# Patient Record
Sex: MALE | Race: OTHER | Employment: FULL TIME | URBAN - METROPOLITAN AREA
[De-identification: names, ages, dates, MRNs, and addresses within clinical notes are randomized per-mention and may not be internally consistent; named-entity substitution may affect disease eponyms.]

---

## 2017-02-06 RX ORDER — AMLODIPINE BESYLATE AND BENAZEPRIL HYDROCHLORIDE 5; 20 MG/1; MG/1
CAPSULE ORAL
Qty: 30 CAPSULE | Refills: 0 | Status: SHIPPED | OUTPATIENT
Start: 2017-02-06 | End: 2017-03-09

## 2017-02-27 RX ORDER — ATORVASTATIN CALCIUM 10 MG/1
TABLET, FILM COATED ORAL
Qty: 30 TABLET | Refills: 0 | Status: SHIPPED | OUTPATIENT
Start: 2017-02-27 | End: 2017-03-09

## 2017-03-09 ENCOUNTER — OFFICE VISIT (OUTPATIENT)
Dept: INTERNAL MEDICINE CLINIC | Facility: CLINIC | Age: 52
End: 2017-03-09

## 2017-03-09 ENCOUNTER — APPOINTMENT (OUTPATIENT)
Dept: LAB | Age: 52
End: 2017-03-09
Attending: FAMILY MEDICINE
Payer: COMMERCIAL

## 2017-03-09 VITALS
TEMPERATURE: 98 F | WEIGHT: 161.5 LBS | SYSTOLIC BLOOD PRESSURE: 118 MMHG | BODY MASS INDEX: 26.91 KG/M2 | OXYGEN SATURATION: 98 % | HEIGHT: 65 IN | HEART RATE: 70 BPM | DIASTOLIC BLOOD PRESSURE: 82 MMHG | RESPIRATION RATE: 16 BRPM

## 2017-03-09 DIAGNOSIS — I10 ESSENTIAL HYPERTENSION, BENIGN: Primary | ICD-10-CM

## 2017-03-09 DIAGNOSIS — E78.00 PURE HYPERCHOLESTEROLEMIA: ICD-10-CM

## 2017-03-09 DIAGNOSIS — I10 ESSENTIAL HYPERTENSION, BENIGN: ICD-10-CM

## 2017-03-09 DIAGNOSIS — M1A.9XX0 CHRONIC GOUT, UNSPECIFIED CAUSE, UNSPECIFIED SITE: ICD-10-CM

## 2017-03-09 DIAGNOSIS — E55.9 VITAMIN D DEFICIENCY: ICD-10-CM

## 2017-03-09 LAB
25-HYDROXYVITAMIN D (TOTAL): 27.6 NG/ML (ref 30–100)
ALBUMIN SERPL-MCNC: 4.2 G/DL (ref 3.5–4.8)
ALP LIVER SERPL-CCNC: 93 U/L (ref 45–117)
ALT SERPL-CCNC: 48 U/L (ref 17–63)
AST SERPL-CCNC: 22 U/L (ref 15–41)
BILIRUB SERPL-MCNC: 0.7 MG/DL (ref 0.1–2)
BUN BLD-MCNC: 10 MG/DL (ref 8–20)
CALCIUM BLD-MCNC: 9.5 MG/DL (ref 8.3–10.3)
CHLORIDE: 106 MMOL/L (ref 101–111)
CHOLEST SMN-MCNC: 157 MG/DL (ref ?–200)
CO2: 28 MMOL/L (ref 22–32)
COMPLEXED PSA SERPL-MCNC: 3.26 NG/ML (ref 0.01–4)
CREAT BLD-MCNC: 1.05 MG/DL (ref 0.7–1.3)
EST. AVERAGE GLUCOSE BLD GHB EST-MCNC: 108 MG/DL (ref 68–126)
GLUCOSE BLD-MCNC: 97 MG/DL (ref 70–99)
HBA1C MFR BLD HPLC: 5.4 % (ref ?–5.7)
HDLC SERPL-MCNC: 45 MG/DL (ref 45–?)
HDLC SERPL: 3.49 {RATIO} (ref ?–4.97)
LDLC SERPL CALC-MCNC: 60 MG/DL (ref ?–130)
M PROTEIN MFR SERPL ELPH: 8.3 G/DL (ref 6.1–8.3)
NONHDLC SERPL-MCNC: 112 MG/DL (ref ?–130)
POTASSIUM SERPL-SCNC: 4.7 MMOL/L (ref 3.6–5.1)
SODIUM SERPL-SCNC: 141 MMOL/L (ref 136–144)
TRIGLYCERIDES: 261 MG/DL (ref ?–150)
VLDL: 52 MG/DL (ref 5–40)

## 2017-03-09 PROCEDURE — 99214 OFFICE O/P EST MOD 30 MIN: CPT | Performed by: FAMILY MEDICINE

## 2017-03-09 PROCEDURE — 36415 COLL VENOUS BLD VENIPUNCTURE: CPT

## 2017-03-09 PROCEDURE — 80061 LIPID PANEL: CPT

## 2017-03-09 PROCEDURE — 80053 COMPREHEN METABOLIC PANEL: CPT

## 2017-03-09 PROCEDURE — 82306 VITAMIN D 25 HYDROXY: CPT

## 2017-03-09 PROCEDURE — 83036 HEMOGLOBIN GLYCOSYLATED A1C: CPT

## 2017-03-09 RX ORDER — AMLODIPINE BESYLATE AND BENAZEPRIL HYDROCHLORIDE 5; 20 MG/1; MG/1
CAPSULE ORAL
Qty: 90 CAPSULE | Refills: 1 | Status: SHIPPED | OUTPATIENT
Start: 2017-03-09 | End: 2018-04-25

## 2017-03-09 RX ORDER — ATORVASTATIN CALCIUM 10 MG/1
TABLET, FILM COATED ORAL
Qty: 90 TABLET | Refills: 1 | Status: SHIPPED | OUTPATIENT
Start: 2017-03-09 | End: 2017-10-02

## 2017-03-09 NOTE — PROGRESS NOTES
HPI:    Patient ID: Darin Celis is a 54year old male. HPI  Darin Celis is a 54year old male.   HPI:   Here for med ck  overall feels well   BP run 110-130/70-80      Taking meds  No issues w it    On chol med and is fasting today  Gout is g appears stable   CPM w meds          The patient indicates understanding of these issues and agrees to the plan. The patient is asked to return in 6mo.     Review of Systems           Current Outpatient Prescriptions:  Atorvastatin Calcium 10 MG Oral Tab T

## 2017-03-13 RX ORDER — FEBUXOSTAT 40 MG/1
TABLET ORAL
Qty: 90 TABLET | Refills: 0 | Status: SHIPPED | OUTPATIENT
Start: 2017-03-13 | End: 2017-06-28

## 2017-06-28 RX ORDER — FEBUXOSTAT 40 MG/1
TABLET ORAL
Qty: 90 TABLET | Refills: 0 | Status: SHIPPED | OUTPATIENT
Start: 2017-06-28 | End: 2017-10-30

## 2017-10-03 RX ORDER — ATORVASTATIN CALCIUM 10 MG/1
TABLET, FILM COATED ORAL
Qty: 90 TABLET | Refills: 1 | Status: SHIPPED | OUTPATIENT
Start: 2017-10-03 | End: 2018-04-15

## 2017-10-19 RX ORDER — FEBUXOSTAT 40 MG/1
TABLET ORAL
Qty: 90 TABLET | Refills: 0 | OUTPATIENT
Start: 2017-10-19

## 2017-10-30 ENCOUNTER — OFFICE VISIT (OUTPATIENT)
Dept: INTERNAL MEDICINE CLINIC | Facility: CLINIC | Age: 52
End: 2017-10-30

## 2017-10-30 VITALS
BODY MASS INDEX: 27 KG/M2 | RESPIRATION RATE: 16 BRPM | TEMPERATURE: 98 F | SYSTOLIC BLOOD PRESSURE: 128 MMHG | DIASTOLIC BLOOD PRESSURE: 76 MMHG | HEART RATE: 84 BPM | WEIGHT: 162 LBS

## 2017-10-30 DIAGNOSIS — Z12.11 COLON CANCER SCREENING: ICD-10-CM

## 2017-10-30 DIAGNOSIS — M1A.9XX0 CHRONIC GOUT WITHOUT TOPHUS, UNSPECIFIED CAUSE, UNSPECIFIED SITE: ICD-10-CM

## 2017-10-30 DIAGNOSIS — I10 ESSENTIAL HYPERTENSION, BENIGN: Primary | ICD-10-CM

## 2017-10-30 DIAGNOSIS — E78.00 PURE HYPERCHOLESTEROLEMIA: ICD-10-CM

## 2017-10-30 PROCEDURE — 99214 OFFICE O/P EST MOD 30 MIN: CPT | Performed by: FAMILY MEDICINE

## 2017-10-30 RX ORDER — FEBUXOSTAT 40 MG/1
40 TABLET, FILM COATED ORAL
Qty: 90 TABLET | Refills: 3 | Status: SHIPPED | OUTPATIENT
Start: 2017-10-30 | End: 2018-09-24

## 2017-10-30 NOTE — PROGRESS NOTES
HPI:    Patient ID: Mio Rosenberg is a 54year old male. HPI  Mio Rosenberg is a 54year old male.   HPI:   Here for med ck   Overall doing well  Taking his meds as directed      BP is good 130/80 range   Walks 3x a week  No issues w it     No re stable w meds  CPM       (M1A.9XX0) Chronic gout without tophus, unspecified cause, unspecified site  Plan: on meds  Seems stable    (Z12.11) Colon cancer screening  Plan: Dr Chaves Geardillon          The patient indicates understanding of these issues and agrees to

## 2018-04-16 RX ORDER — ATORVASTATIN CALCIUM 10 MG/1
TABLET, FILM COATED ORAL
Qty: 30 TABLET | Refills: 0 | Status: SHIPPED | OUTPATIENT
Start: 2018-04-16 | End: 2018-09-24

## 2018-04-25 RX ORDER — AMLODIPINE BESYLATE AND BENAZEPRIL HYDROCHLORIDE 5; 20 MG/1; MG/1
CAPSULE ORAL
Qty: 90 CAPSULE | Refills: 0 | Status: SHIPPED | OUTPATIENT
Start: 2018-04-25 | End: 2018-09-24

## 2018-07-24 RX ORDER — AMLODIPINE BESYLATE AND BENAZEPRIL HYDROCHLORIDE 5; 20 MG/1; MG/1
CAPSULE ORAL
Qty: 90 CAPSULE | Refills: 0 | OUTPATIENT
Start: 2018-07-24

## 2018-09-24 ENCOUNTER — OFFICE VISIT (OUTPATIENT)
Dept: INTERNAL MEDICINE CLINIC | Facility: CLINIC | Age: 53
End: 2018-09-24
Payer: COMMERCIAL

## 2018-09-24 VITALS
RESPIRATION RATE: 12 BRPM | HEIGHT: 65 IN | BODY MASS INDEX: 27.16 KG/M2 | HEART RATE: 68 BPM | TEMPERATURE: 99 F | DIASTOLIC BLOOD PRESSURE: 82 MMHG | WEIGHT: 163 LBS | SYSTOLIC BLOOD PRESSURE: 126 MMHG

## 2018-09-24 DIAGNOSIS — Z12.11 COLON CANCER SCREENING: ICD-10-CM

## 2018-09-24 DIAGNOSIS — I10 ESSENTIAL HYPERTENSION, BENIGN: Primary | ICD-10-CM

## 2018-09-24 DIAGNOSIS — M1A.9XX0 CHRONIC GOUT WITHOUT TOPHUS, UNSPECIFIED CAUSE, UNSPECIFIED SITE: ICD-10-CM

## 2018-09-24 DIAGNOSIS — E78.00 PURE HYPERCHOLESTEROLEMIA: ICD-10-CM

## 2018-09-24 DIAGNOSIS — Z12.5 SPECIAL SCREENING, PROSTATE CANCER: ICD-10-CM

## 2018-09-24 PROCEDURE — 99214 OFFICE O/P EST MOD 30 MIN: CPT | Performed by: FAMILY MEDICINE

## 2018-09-24 RX ORDER — AMLODIPINE BESYLATE AND BENAZEPRIL HYDROCHLORIDE 5; 20 MG/1; MG/1
CAPSULE ORAL
Qty: 90 CAPSULE | Refills: 0 | Status: SHIPPED | OUTPATIENT
Start: 2018-09-24 | End: 2018-12-20

## 2018-09-24 RX ORDER — FEBUXOSTAT 40 MG/1
40 TABLET, FILM COATED ORAL
Qty: 90 TABLET | Refills: 3 | Status: SHIPPED | OUTPATIENT
Start: 2018-09-24 | End: 2019-08-23

## 2018-09-24 RX ORDER — ATORVASTATIN CALCIUM 10 MG/1
10 TABLET, FILM COATED ORAL NIGHTLY
Qty: 90 TABLET | Refills: 0 | Status: SHIPPED | OUTPATIENT
Start: 2018-09-24 | End: 2019-08-23

## 2018-09-24 NOTE — PROGRESS NOTES
HPI:    Patient ID: Laura Session is a 50year old male. HPI  Laura Session is a 50year old male. HPI:   Patient presents for recheck of his hypertension/med ck  .  Pt has been taking medications as instructed, no medication side effects, ho status: Former Smoker        Quit date: 2001        Years since quittin.7      Smokeless tobacco: Never Used    Alcohol use: No      Alcohol/week: 0.0 oz    Drug use: No        REVIEW OF SYSTEMS:   GENERAL HEALTH: feels well otherwise  RESPIRATOR Prescriptions Disp Refills   • Amlodipine Besy-Benazepril HCl 5-20 MG Oral Cap 90 capsule 0     Sig: TAKE 1 CAPSULE BY MOUTH ONCE DAILY. • febuxostat (ULORIC) 40 MG Oral Tab 90 tablet 3     Sig: Take 1 tablet (40 mg total) by mouth once daily.    • atorva

## 2018-11-20 ENCOUNTER — APPOINTMENT (OUTPATIENT)
Dept: LAB | Age: 53
End: 2018-11-20
Attending: FAMILY MEDICINE
Payer: COMMERCIAL

## 2018-11-20 DIAGNOSIS — Z12.5 SPECIAL SCREENING, PROSTATE CANCER: ICD-10-CM

## 2018-11-20 DIAGNOSIS — E78.00 PURE HYPERCHOLESTEROLEMIA: ICD-10-CM

## 2018-11-20 DIAGNOSIS — I10 ESSENTIAL HYPERTENSION, BENIGN: ICD-10-CM

## 2018-11-20 PROCEDURE — 80061 LIPID PANEL: CPT | Performed by: FAMILY MEDICINE

## 2018-11-20 PROCEDURE — 36415 COLL VENOUS BLD VENIPUNCTURE: CPT | Performed by: FAMILY MEDICINE

## 2018-11-20 PROCEDURE — 80053 COMPREHEN METABOLIC PANEL: CPT | Performed by: FAMILY MEDICINE

## 2018-11-20 PROCEDURE — 84153 ASSAY OF PSA TOTAL: CPT | Performed by: FAMILY MEDICINE

## 2018-12-20 RX ORDER — AMLODIPINE BESYLATE AND BENAZEPRIL HYDROCHLORIDE 5; 20 MG/1; MG/1
CAPSULE ORAL
Qty: 90 CAPSULE | Refills: 0 | Status: SHIPPED | OUTPATIENT
Start: 2018-12-20 | End: 2019-03-27

## 2018-12-20 NOTE — TELEPHONE ENCOUNTER
Approved per protocol  Amlodipine  Last OV relevant to medication: 9/24/18  Last refill date: 9/24/18 #/refills: 0  When pt was asked to return for OV: 6 months  Upcoming appt/reason: 12/27/18  Recent Labs:  11/20/18: PSA/Lipid/ CMP

## 2018-12-27 ENCOUNTER — OFFICE VISIT (OUTPATIENT)
Dept: INTERNAL MEDICINE CLINIC | Facility: CLINIC | Age: 53
End: 2018-12-27
Payer: COMMERCIAL

## 2018-12-27 VITALS
SYSTOLIC BLOOD PRESSURE: 134 MMHG | HEIGHT: 64 IN | HEART RATE: 85 BPM | RESPIRATION RATE: 16 BRPM | BODY MASS INDEX: 28.25 KG/M2 | TEMPERATURE: 99 F | OXYGEN SATURATION: 98 % | DIASTOLIC BLOOD PRESSURE: 72 MMHG | WEIGHT: 165.5 LBS

## 2018-12-27 DIAGNOSIS — Z12.11 SCREEN FOR COLON CANCER: Primary | ICD-10-CM

## 2018-12-27 DIAGNOSIS — Z00.00 WELLNESS EXAMINATION: ICD-10-CM

## 2018-12-27 PROCEDURE — 99396 PREV VISIT EST AGE 40-64: CPT | Performed by: FAMILY MEDICINE

## 2018-12-27 NOTE — PROGRESS NOTES
HPI:    Patient ID: Carl Santillan is a 48year old male.     HPI  Carl Santillan is a 48year old male who presents for a complete physical exam.   HPI:       Wt Readings from Last 6 Encounters:  12/27/18 : 165 lb 8 oz  09/24/18 : 163 lb  10/30/17 : oz    Drug use: No     .        REVIEW OF SYSTEMS:   GENERAL: feels well otherwise  SKIN: denies rash  HEENT: denies nasal congestion, sinus pain or ST  LUNGS: denies shortness of breath  CARDIOVASCULAR: denies chest pain on exertion  GI: denies abdominal Known Allergies   PHYSICAL EXAM:   Physical Exam           ASSESSMENT/PLAN:   Screen for colon cancer  (primary encounter diagnosis)  Wellness examination    No orders of the defined types were placed in this encounter.       Meds This Visit:  Requested Pre

## 2019-03-27 RX ORDER — AMLODIPINE BESYLATE AND BENAZEPRIL HYDROCHLORIDE 5; 20 MG/1; MG/1
CAPSULE ORAL
Qty: 90 CAPSULE | Refills: 0 | Status: SHIPPED | OUTPATIENT
Start: 2019-03-27 | End: 2019-07-05

## 2019-07-05 RX ORDER — AMLODIPINE BESYLATE AND BENAZEPRIL HYDROCHLORIDE 5; 20 MG/1; MG/1
CAPSULE ORAL
Qty: 30 CAPSULE | Refills: 0 | Status: SHIPPED | OUTPATIENT
Start: 2019-07-05 | End: 2019-08-23

## 2019-07-05 NOTE — TELEPHONE ENCOUNTER
Failed protocol     Last refill:  03/27/2019 Amlodipine 5/20 #90 NR    LOV:   12/27/2018 Dr Kings Palmer RTC 1 year     No FOV scheduled

## 2019-08-05 RX ORDER — AMLODIPINE BESYLATE AND BENAZEPRIL HYDROCHLORIDE 5; 20 MG/1; MG/1
CAPSULE ORAL
Qty: 30 CAPSULE | Refills: 0 | OUTPATIENT
Start: 2019-08-05

## 2019-08-05 NOTE — TELEPHONE ENCOUNTER
LVM for patient to call office and make appointment.     Last filled 7/5/19 #30    LOV 12/27/18     Last labs 11/201/8

## 2019-08-09 PROBLEM — K64.1 SECOND DEGREE HEMORRHOIDS: Status: ACTIVE | Noted: 2019-08-09

## 2019-08-09 PROBLEM — K57.30 DIVERTICULOSIS OF LARGE INTESTINE WITHOUT PERFORATION OR ABSCESS WITHOUT BLEEDING: Status: ACTIVE | Noted: 2019-08-09

## 2019-08-09 PROBLEM — Z12.11 SPECIAL SCREENING FOR MALIGNANT NEOPLASM OF COLON: Status: ACTIVE | Noted: 2019-08-09

## 2019-08-23 ENCOUNTER — OFFICE VISIT (OUTPATIENT)
Dept: INTERNAL MEDICINE CLINIC | Facility: CLINIC | Age: 54
End: 2019-08-23
Payer: COMMERCIAL

## 2019-08-23 VITALS
DIASTOLIC BLOOD PRESSURE: 78 MMHG | OXYGEN SATURATION: 94 % | HEIGHT: 65 IN | WEIGHT: 162.75 LBS | RESPIRATION RATE: 16 BRPM | HEART RATE: 80 BPM | TEMPERATURE: 98 F | BODY MASS INDEX: 27.11 KG/M2 | SYSTOLIC BLOOD PRESSURE: 128 MMHG

## 2019-08-23 DIAGNOSIS — I10 ESSENTIAL HYPERTENSION, BENIGN: Primary | ICD-10-CM

## 2019-08-23 DIAGNOSIS — M1A.9XX0 CHRONIC GOUT WITHOUT TOPHUS, UNSPECIFIED CAUSE, UNSPECIFIED SITE: ICD-10-CM

## 2019-08-23 DIAGNOSIS — E78.00 PURE HYPERCHOLESTEROLEMIA: ICD-10-CM

## 2019-08-23 DIAGNOSIS — Z12.5 SPECIAL SCREENING, PROSTATE CANCER: ICD-10-CM

## 2019-08-23 PROCEDURE — 99214 OFFICE O/P EST MOD 30 MIN: CPT | Performed by: FAMILY MEDICINE

## 2019-08-23 RX ORDER — ATORVASTATIN CALCIUM 10 MG/1
10 TABLET, FILM COATED ORAL NIGHTLY
Qty: 90 TABLET | Refills: 1 | Status: SHIPPED | OUTPATIENT
Start: 2019-08-23 | End: 2020-02-17

## 2019-08-23 RX ORDER — AMLODIPINE BESYLATE AND BENAZEPRIL HYDROCHLORIDE 5; 20 MG/1; MG/1
1 CAPSULE ORAL
Qty: 30 CAPSULE | Refills: 1 | Status: SHIPPED | OUTPATIENT
Start: 2019-08-23 | End: 2019-10-18

## 2019-08-23 RX ORDER — FEBUXOSTAT 40 MG/1
40 TABLET, FILM COATED ORAL
Qty: 90 TABLET | Refills: 1 | Status: SHIPPED | OUTPATIENT
Start: 2019-08-23 | End: 2020-03-30

## 2019-08-23 NOTE — PROGRESS NOTES
HPI:    Patient ID: Pastor Salgado is a 48year old male. HPI  Pastor Salgado is a 48year old male. HPI:   Pt is here for med ck/follow up. Overall is doing well. Is taking meds as directed.   No issues w medications      Did have colonoscopy °C) (Oral)   Resp 16   Ht 65\"   Wt 162 lb 12 oz   SpO2 94%   BMI 27.08 kg/m²   GENERAL: well developed, well nourished,in no apparent distress  SKIN: no rashes  NECK: supple,no adenopathy  LUNGS: clear to auscultation  CARDIO: RRR without murmur  EXTREMIT Oral Tab 90 tablet 3     Sig: Take 1 tablet (40 mg total) by mouth once daily. • atorvastatin 10 MG Oral Tab 90 tablet 0     Sig: Take 1 tablet (10 mg total) by mouth nightly.    • amLODIPine Besy-Benazepril HCl 5-20 MG Oral Cap 30 capsule 0     Sig: Take

## 2019-10-18 ENCOUNTER — TELEPHONE (OUTPATIENT)
Dept: INTERNAL MEDICINE CLINIC | Facility: CLINIC | Age: 54
End: 2019-10-18

## 2019-10-18 RX ORDER — AMLODIPINE BESYLATE AND BENAZEPRIL HYDROCHLORIDE 5; 20 MG/1; MG/1
CAPSULE ORAL
Qty: 30 CAPSULE | Refills: 0 | Status: SHIPPED | OUTPATIENT
Start: 2019-10-18 | End: 2019-10-21

## 2019-10-21 RX ORDER — AMLODIPINE BESYLATE AND BENAZEPRIL HYDROCHLORIDE 5; 20 MG/1; MG/1
1 CAPSULE ORAL
Qty: 90 CAPSULE | Refills: 1 | Status: SHIPPED | OUTPATIENT
Start: 2019-10-21 | End: 2020-04-23

## 2019-12-11 ENCOUNTER — TELEPHONE (OUTPATIENT)
Dept: INTERNAL MEDICINE CLINIC | Facility: CLINIC | Age: 54
End: 2019-12-11

## 2019-12-11 ENCOUNTER — APPOINTMENT (OUTPATIENT)
Dept: LAB | Age: 54
End: 2019-12-11
Attending: FAMILY MEDICINE
Payer: COMMERCIAL

## 2019-12-11 DIAGNOSIS — I10 ESSENTIAL HYPERTENSION, BENIGN: ICD-10-CM

## 2019-12-11 DIAGNOSIS — E78.00 PURE HYPERCHOLESTEROLEMIA: ICD-10-CM

## 2019-12-11 DIAGNOSIS — Z12.5 SPECIAL SCREENING, PROSTATE CANCER: ICD-10-CM

## 2019-12-11 PROCEDURE — 84153 ASSAY OF PSA TOTAL: CPT | Performed by: FAMILY MEDICINE

## 2019-12-11 PROCEDURE — 80053 COMPREHEN METABOLIC PANEL: CPT | Performed by: FAMILY MEDICINE

## 2019-12-11 PROCEDURE — 80061 LIPID PANEL: CPT | Performed by: FAMILY MEDICINE

## 2019-12-11 PROCEDURE — 36415 COLL VENOUS BLD VENIPUNCTURE: CPT | Performed by: FAMILY MEDICINE

## 2019-12-23 ENCOUNTER — OFFICE VISIT (OUTPATIENT)
Dept: SURGERY | Facility: CLINIC | Age: 54
End: 2019-12-23
Payer: COMMERCIAL

## 2019-12-23 VITALS — SYSTOLIC BLOOD PRESSURE: 167 MMHG | HEART RATE: 77 BPM | DIASTOLIC BLOOD PRESSURE: 91 MMHG

## 2019-12-23 DIAGNOSIS — R97.20 ELEVATED PSA: Primary | ICD-10-CM

## 2019-12-23 PROCEDURE — 99243 OFF/OP CNSLTJ NEW/EST LOW 30: CPT | Performed by: UROLOGY

## 2019-12-23 PROCEDURE — 81003 URINALYSIS AUTO W/O SCOPE: CPT | Performed by: UROLOGY

## 2019-12-23 NOTE — PROGRESS NOTES
Rooming Clinician:     Darin Celis is a 48year old male. No chief complaint on file. HPI:     Patient comes to the office for evaluation of an elevated PSA of 10.6 ng/mL. PSA was 3.96 in 2018 and 3.26 in 2017.   Patient has an uncle on his mot Never Used    Alcohol use: No      Alcohol/week: 0.0 standard drinks    Drug use: No       REVIEW OF SYSTEMS:     GENERAL HEALTH: feels well otherwise  SKIN: denies any unusual skin lesions or rashes  RESPIRATORY: denies shortness of breath with exertion urinary and blood tests that can project the risk of prostate cancer when the PSA is elevated.  I discussed other means of evaluating the prostate including transrectal ultrasonography and needle biopsy of the prostate, 3 tanisha MRI, fusion prostate biopsy,

## 2019-12-26 ENCOUNTER — APPOINTMENT (OUTPATIENT)
Dept: LAB | Age: 54
End: 2019-12-26
Attending: UROLOGY
Payer: COMMERCIAL

## 2019-12-26 PROCEDURE — 84154 ASSAY OF PSA FREE: CPT | Performed by: UROLOGY

## 2019-12-26 PROCEDURE — 36415 COLL VENOUS BLD VENIPUNCTURE: CPT | Performed by: UROLOGY

## 2019-12-26 PROCEDURE — 84153 ASSAY OF PSA TOTAL: CPT | Performed by: UROLOGY

## 2019-12-27 ENCOUNTER — TELEPHONE (OUTPATIENT)
Dept: SURGERY | Facility: CLINIC | Age: 54
End: 2019-12-27

## 2019-12-27 ENCOUNTER — OFFICE VISIT (OUTPATIENT)
Dept: INTERNAL MEDICINE CLINIC | Facility: CLINIC | Age: 54
End: 2019-12-27
Payer: COMMERCIAL

## 2019-12-27 VITALS
HEIGHT: 65 IN | HEART RATE: 74 BPM | DIASTOLIC BLOOD PRESSURE: 68 MMHG | SYSTOLIC BLOOD PRESSURE: 124 MMHG | RESPIRATION RATE: 16 BRPM | OXYGEN SATURATION: 98 % | WEIGHT: 164.25 LBS | TEMPERATURE: 98 F | BODY MASS INDEX: 27.36 KG/M2

## 2019-12-27 DIAGNOSIS — Z00.00 WELLNESS EXAMINATION: Primary | ICD-10-CM

## 2019-12-27 DIAGNOSIS — Z00.00 LABORATORY EXAM ORDERED AS PART OF ROUTINE GENERAL MEDICAL EXAMINATION: ICD-10-CM

## 2019-12-27 DIAGNOSIS — S76.212A GROIN STRAIN, LEFT, INITIAL ENCOUNTER: ICD-10-CM

## 2019-12-27 DIAGNOSIS — N52.9 ERECTILE DYSFUNCTION, UNSPECIFIED ERECTILE DYSFUNCTION TYPE: ICD-10-CM

## 2019-12-27 DIAGNOSIS — I10 ESSENTIAL HYPERTENSION, BENIGN: ICD-10-CM

## 2019-12-27 PROCEDURE — 99213 OFFICE O/P EST LOW 20 MIN: CPT | Performed by: FAMILY MEDICINE

## 2019-12-27 PROCEDURE — 99396 PREV VISIT EST AGE 40-64: CPT | Performed by: FAMILY MEDICINE

## 2019-12-27 NOTE — TELEPHONE ENCOUNTER
S/W pt and gave results and instructions in RP's msg below and I also gave pt an appt for Thurs.  1/9 at 8am

## 2019-12-27 NOTE — TELEPHONE ENCOUNTER
----- Message from Elias Cox MD sent at 12/26/2019  4:01 PM CST -----  Your recent PSA is abnormal.  Total PSA is still 11.3. Free PSA is 17%. Free PSA is an indeterminate range.   When lower than 10% suggest higher risk of prostate cancer and low

## 2019-12-27 NOTE — PROGRESS NOTES
HPI:    Patient ID: Maribel Alvarado is a 48year old male.     HPI  Maribel Alvarado is a 48year old male who presents for a complete physical exam.   HPI:       Wt Readings from Last 6 Encounters:  12/27/19 : 164 lb 4 oz (74.5 kg)  08/23/19 : 162 lb 12 essential hypertension    • Wears glasses       Past Surgical History:   Procedure Laterality Date   • OTHER SURGICAL HISTORY      ganglion cyst rxn      Family History   Problem Relation Age of Onset   • Hypertension Father    • Hypertension Mother    • S lesions   Mild disc to palpation L groin area     EXTREMITIES: no edema  NEURO: motor and sensory are grossly intact    ASSESSMENT AND PLAN:   Jacqui Edmonds is a 48year old male who presents for a complete physical exam. Pt's weight is Body mass index Referrals:  None       [id number]

## 2020-01-09 ENCOUNTER — OFFICE VISIT (OUTPATIENT)
Dept: SURGERY | Facility: CLINIC | Age: 55
End: 2020-01-09
Payer: COMMERCIAL

## 2020-01-09 VITALS — DIASTOLIC BLOOD PRESSURE: 95 MMHG | SYSTOLIC BLOOD PRESSURE: 147 MMHG | HEART RATE: 73 BPM

## 2020-01-09 DIAGNOSIS — R97.20 ELEVATED PSA: Primary | ICD-10-CM

## 2020-01-09 PROCEDURE — 99213 OFFICE O/P EST LOW 20 MIN: CPT | Performed by: UROLOGY

## 2020-01-09 NOTE — PROGRESS NOTES
Rooming Clinician:     Deangelo Wilder is a 48year old male. Patient presents with:  elevated psa: Here for Select Mdx test        HPI:     Patient returns with a history of an elevated PSA 11.3 on repeat with free PSA of 17%.   No significant voiding s HPI  NEURO: no sensory or motor complaint    EXAM:     BP (!) 147/95   Pulse 73   GENERAL: well developed, well nourished,in no apparent distress  SKIN: no rashes,no suspicious lesions  HEENT: atraumatic, normocephalic,ears and throat are clear  NECK: supp

## 2020-01-24 ENCOUNTER — PATIENT MESSAGE (OUTPATIENT)
Dept: SURGERY | Facility: CLINIC | Age: 55
End: 2020-01-24

## 2020-01-24 ENCOUNTER — APPOINTMENT (OUTPATIENT)
Dept: LAB | Age: 55
End: 2020-01-24
Attending: FAMILY MEDICINE
Payer: COMMERCIAL

## 2020-01-24 DIAGNOSIS — R97.20 ELEVATED PSA: Primary | ICD-10-CM

## 2020-01-24 DIAGNOSIS — N52.9 ERECTILE DYSFUNCTION, UNSPECIFIED ERECTILE DYSFUNCTION TYPE: ICD-10-CM

## 2020-01-24 LAB — TSI SER-ACNC: 0.8 MIU/ML (ref 0.36–3.74)

## 2020-01-24 PROCEDURE — 84443 ASSAY THYROID STIM HORMONE: CPT

## 2020-01-24 PROCEDURE — 36415 COLL VENOUS BLD VENIPUNCTURE: CPT

## 2020-01-24 PROCEDURE — 84403 ASSAY OF TOTAL TESTOSTERONE: CPT

## 2020-01-24 PROCEDURE — 84402 ASSAY OF FREE TESTOSTERONE: CPT

## 2020-01-24 NOTE — TELEPHONE ENCOUNTER
Please advised patient that select urinary MDX is abnormal.  Does suggest significant risk of prostate cancer. Recommend prostate MRI and then follow-up discussion in the office.

## 2020-01-29 ENCOUNTER — TELEPHONE (OUTPATIENT)
Dept: SURGERY | Facility: CLINIC | Age: 55
End: 2020-01-29

## 2020-01-29 LAB
TESTOSTERONE TOTAL: 252 NG/DL
TESTOSTERONE, FREE -MS/MS: 75.4 PG/ML

## 2020-01-29 NOTE — TELEPHONE ENCOUNTER
Per Tre Balderas, needing to speak to Dr. Robbin Pickens or nurses regarding select mdx testing.  Needing updated insurane Reference number is [id number]  Please advise

## 2020-01-30 NOTE — TELEPHONE ENCOUNTER
Please notify pt when MRI is approved. Spoke with patient and reviewed Select Mdx result and Dr Ruslan Cody recommendation. Order for MRI will be place. I informed pt to wait for the call from manage care with prior auth.  Scheduling number given to pt

## 2020-02-11 ENCOUNTER — TELEPHONE (OUTPATIENT)
Dept: SURGERY | Facility: CLINIC | Age: 55
End: 2020-02-11

## 2020-02-11 NOTE — TELEPHONE ENCOUNTER
Pt called stating pt is scheduled for mri 2-12-20 at 7:30pm.  Has the mri been approved by pt's insurance.   Please call pt

## 2020-02-12 ENCOUNTER — PATIENT MESSAGE (OUTPATIENT)
Dept: SURGERY | Facility: CLINIC | Age: 55
End: 2020-02-12

## 2020-02-12 NOTE — TELEPHONE ENCOUNTER
Spoke with patient and he rescheduled for 2/24/20.   I have contacted Karen Reyes to help expedite the approval.

## 2020-02-17 RX ORDER — ATORVASTATIN CALCIUM 10 MG/1
TABLET, FILM COATED ORAL
Qty: 90 TABLET | Refills: 0 | Status: SHIPPED | OUTPATIENT
Start: 2020-02-17 | End: 2020-06-01

## 2020-02-17 NOTE — TELEPHONE ENCOUNTER
Received authorization for mri with and without contrast auth valid 2/14/2020-8/12/2020  auth # is [id number]

## 2020-02-25 ENCOUNTER — TELEPHONE (OUTPATIENT)
Dept: SURGERY | Facility: CLINIC | Age: 55
End: 2020-02-25

## 2020-02-25 NOTE — TELEPHONE ENCOUNTER
Wife is requesting to speak to the nurse or Dr Edison Hernandez about MRI test that the pt is supposed to get done at [facility] as pts appt. Has been cx twice. Wife States that [facility] did not have the necessary equipment to get the test done.  Wife states that

## 2020-02-25 NOTE — TELEPHONE ENCOUNTER
Patient called back. Confirmed his MRI on 3/9 7am at [facility]. He also asked about some charges made on his account. Referred him to Billing Dept at [phone]    All questions answered. This encounter is now closed.

## 2020-03-09 ENCOUNTER — HOSPITAL ENCOUNTER (OUTPATIENT)
Dept: MRI IMAGING | Facility: HOSPITAL | Age: 55
Discharge: HOME | End: 2020-03-09
Attending: UROLOGY
Payer: COMMERCIAL

## 2020-03-09 DIAGNOSIS — R97.20 ELEVATED PSA: ICD-10-CM

## 2020-03-09 LAB — CREAT BLD-MCNC: 1 MG/DL (ref 0.7–1.3)

## 2020-03-09 PROCEDURE — 72197 MRI PELVIS W/O & W/DYE: CPT | Performed by: UROLOGY

## 2020-03-09 PROCEDURE — 82565 ASSAY OF CREATININE: CPT

## 2020-03-09 PROCEDURE — A9575 INJ GADOTERATE MEGLUMI 0.1ML: HCPCS | Performed by: UROLOGY

## 2020-03-11 NOTE — PROGRESS NOTES
Your recent MRI of the prostate shows the prostate volume of 76 mL. Normal is 15-20. This suggests fairly substantial enlargement of the prostate which can sometimes account for an elevation of the PSA.   It shows no highly suggestive or significant abnor

## 2020-03-13 ENCOUNTER — TELEPHONE (OUTPATIENT)
Dept: SURGERY | Facility: CLINIC | Age: 55
End: 2020-03-13

## 2020-03-13 NOTE — TELEPHONE ENCOUNTER
Spoke to patient to relay MD's message regarding his MRI result. Patient confirmed that he already read the result at [facility] [address]. Patient has na appointment on 3/16 at 215pm.     All questions answered.

## 2020-03-16 ENCOUNTER — TELEPHONE (OUTPATIENT)
Dept: SURGERY | Facility: CLINIC | Age: 55
End: 2020-03-16

## 2020-03-16 NOTE — TELEPHONE ENCOUNTER
Spoke to patient. Requesting to speak to MD to discuss his MRI result. Patient unable to see him today's appointment due to [address] situation. MD made aware face-to-face.

## 2020-03-16 NOTE — TELEPHONE ENCOUNTER
Informed patient that MD already aware of his personal request for a call back regarding his MRI discussions.

## 2020-03-16 NOTE — TELEPHONE ENCOUNTER
Call patient and spouse regarding reasons for having a biopsy. I reviewed his PSA history showing a recent PSA of about 11 and free PSA of 17%. Abnormal urinary select MDX.   MRI shows prostate volume of 76 cc with PSA density of 0.144 patient and spouse

## 2020-03-17 RX ORDER — CIPROFLOXACIN 500 MG/1
500 TABLET, FILM COATED ORAL 2 TIMES DAILY
Qty: 6 TABLET | Refills: 0 | Status: SHIPPED | OUTPATIENT
Start: 2020-03-17 | End: 2020-03-20

## 2020-03-17 RX ORDER — CEFDINIR 300 MG/1
300 CAPSULE ORAL EVERY 12 HOURS
Qty: 6 CAPSULE | Refills: 0 | Status: SHIPPED | OUTPATIENT
Start: 2020-03-17 | End: 2020-03-20

## 2020-03-17 NOTE — TELEPHONE ENCOUNTER
Spoke with patient ad scheduled TRUS PNBX on 4/2/20. Reviewed all pre op instrucitons and sent them to pt via email. Verbalized understanding. [email or web address]

## 2020-03-30 RX ORDER — FEBUXOSTAT 40 MG/1
TABLET, FILM COATED ORAL
Qty: 90 TABLET | Refills: 0 | Status: SHIPPED | OUTPATIENT
Start: 2020-03-30 | End: 2020-06-01

## 2020-03-30 NOTE — TELEPHONE ENCOUNTER
Febuxostat 40 mg  Last OV relevant to medication: 12-27-19  Last refill date: 8-23-19 #/refills: 1  When pt was asked to return for OV: CPX 1yr  Upcoming appt/reason: none  Recent labs: none

## 2020-04-23 RX ORDER — AMLODIPINE BESYLATE AND BENAZEPRIL HYDROCHLORIDE 5; 20 MG/1; MG/1
CAPSULE ORAL
Qty: 90 CAPSULE | Refills: 0 | Status: SHIPPED | OUTPATIENT
Start: 2020-04-23 | End: 2020-06-01

## 2020-04-23 NOTE — TELEPHONE ENCOUNTER
AMLODIPINE-BENAZEPRIL 5-20 MG  Protocol Passed     Last OV relevant to medication: 12/27/19  Last refill date: 10/21/19      #/refills: #90, 1 refill  When pt was asked to return for OV: 1 yr   Upcoming appt/reason: none scheduled  Recent Labs: 12/11/19

## 2020-05-04 ENCOUNTER — TELEPHONE (OUTPATIENT)
Dept: SURGERY | Facility: CLINIC | Age: 55
End: 2020-05-04

## 2020-05-04 ENCOUNTER — PATIENT MESSAGE (OUTPATIENT)
Dept: SURGERY | Facility: CLINIC | Age: 55
End: 2020-05-04

## 2020-05-04 NOTE — TELEPHONE ENCOUNTER
Pt has a biopsy scheduled for Thursday and was prescribed medication has questions about when should he take the medication before or after biopsy please advise

## 2020-05-05 NOTE — TELEPHONE ENCOUNTER
From: Gisela Bo  To: Caleb oRss MD  Sent: 5/4/2020 1:25 PM CDT  Subject: Prescription Question    Good afternoon. The instruction for taking ciprofloxacin HCL was not clear. Do I need to take it 3days before the biopsy or after?  Instruction

## 2020-05-05 NOTE — TELEPHONE ENCOUNTER
Spoke to patient regarding his concerns of his medication. Per patient, he was already given a called back and instructions were provided. Patient thankful and no more questions at this time.

## 2020-05-07 ENCOUNTER — PROCEDURE (OUTPATIENT)
Dept: SURGERY | Facility: CLINIC | Age: 55
End: 2020-05-07
Payer: COMMERCIAL

## 2020-05-07 VITALS — DIASTOLIC BLOOD PRESSURE: 85 MMHG | TEMPERATURE: 99 F | HEART RATE: 84 BPM | SYSTOLIC BLOOD PRESSURE: 131 MMHG

## 2020-05-07 DIAGNOSIS — N40.0 ENLARGED PROSTATE: Primary | ICD-10-CM

## 2020-05-07 DIAGNOSIS — R97.20 ELEVATED PSA: ICD-10-CM

## 2020-05-07 PROCEDURE — 76872 US TRANSRECTAL: CPT | Performed by: UROLOGY

## 2020-05-07 PROCEDURE — 55700 BIOPSY OF PROSTATE,NEEDLE/PUNCH: CPT | Performed by: UROLOGY

## 2020-05-07 NOTE — PROGRESS NOTES
Rooming Clinician:     Pastor Salgado is a 48year old male. Patient presents with:  Procedure: prostate biopsy        HPI:     Patient is here for a prostate ultrasound and biopsy. He has a history of a elevated PSA of 11.3 with a free PSA of 17%.   P 19.3      Smokeless tobacco: Never Used    Alcohol use: No      Alcohol/week: 0.0 standard drinks    Drug use: No       REVIEW OF SYSTEMS:     GENERAL HEALTH: feels well otherwise  SKIN: denies any unusual skin lesions or rashes  RESPIRATORY: denies shortn cc            NEURO: grossly normal  EXTREMITIES: no cyanosis, clubbing or edema    LAB:     Lab Results   Component Value Date    WBC 6.4 05/13/2016    HGB 14.8 05/13/2016    HCT 44.5 05/13/2016    .0 05/13/2016    CREATSERUM 1.06 12/11/2019    BUN

## 2020-05-11 ENCOUNTER — TELEPHONE (OUTPATIENT)
Dept: SURGERY | Facility: CLINIC | Age: 55
End: 2020-05-11

## 2020-05-11 DIAGNOSIS — R97.20 ELEVATED PSA: Primary | ICD-10-CM

## 2020-05-11 RX ORDER — SILDENAFIL 100 MG/1
100 TABLET, FILM COATED ORAL
Qty: 30 TABLET | Refills: 5 | Status: SHIPPED | OUTPATIENT
Start: 2020-05-11

## 2020-05-11 NOTE — TELEPHONE ENCOUNTER
Called patient to report biopsy report which was negative. No evidence of cancer. Patient does have a history of an enlarged prostate. I explained to him just an enlarged prostate can cause an elevation of the PSA.   No other treatments or recommendation

## 2020-06-01 RX ORDER — AMLODIPINE BESYLATE AND BENAZEPRIL HYDROCHLORIDE 5; 20 MG/1; MG/1
CAPSULE ORAL
Qty: 90 CAPSULE | Refills: 0 | Status: SHIPPED | OUTPATIENT
Start: 2020-06-01 | End: 2020-11-09

## 2020-06-01 RX ORDER — FEBUXOSTAT 40 MG/1
TABLET, FILM COATED ORAL
Qty: 90 TABLET | Refills: 0 | Status: SHIPPED | OUTPATIENT
Start: 2020-06-01 | End: 2020-08-31

## 2020-06-01 RX ORDER — ATORVASTATIN CALCIUM 10 MG/1
TABLET, FILM COATED ORAL
Qty: 90 TABLET | Refills: 0 | Status: SHIPPED | OUTPATIENT
Start: 2020-06-01 | End: 2020-08-31

## 2020-06-01 NOTE — TELEPHONE ENCOUNTER
Atorvastatin 10 mg 1 tab nightly filled 2-17-20 90 with 0 refills   Amlodipine benazepril 5-20 mg 1 cap daily filled 4-23-20 90 with 0 refills   Febuxostat 40 mg 1 tab daily filled 3-30-20 90 with 0 refills     LOV 12-27-19   return for CPX in 1yr.   no up

## 2020-08-31 RX ORDER — ATORVASTATIN CALCIUM 10 MG/1
TABLET, FILM COATED ORAL
Qty: 90 TABLET | Refills: 0 | Status: SHIPPED | OUTPATIENT
Start: 2020-08-31 | End: 2020-12-28

## 2020-08-31 RX ORDER — FEBUXOSTAT 40 MG/1
TABLET, FILM COATED ORAL
Qty: 90 TABLET | Refills: 0 | Status: SHIPPED | OUTPATIENT
Start: 2020-08-31 | End: 2020-12-28

## 2020-08-31 NOTE — TELEPHONE ENCOUNTER
Febuxostat 40mg last filled  6/1/20 #90     LOV 12/27/19     Upcoming appt  - none     Last labs 12/23/19

## 2020-11-09 RX ORDER — AMLODIPINE BESYLATE AND BENAZEPRIL HYDROCHLORIDE 5; 20 MG/1; MG/1
1 CAPSULE ORAL DAILY
Qty: 30 CAPSULE | Refills: 0 | Status: SHIPPED | OUTPATIENT
Start: 2020-11-09 | End: 2020-12-28

## 2020-12-07 RX ORDER — AMLODIPINE BESYLATE AND BENAZEPRIL HYDROCHLORIDE 5; 20 MG/1; MG/1
CAPSULE ORAL
Qty: 30 CAPSULE | Refills: 0 | OUTPATIENT
Start: 2020-12-07

## 2020-12-21 RX ORDER — ATORVASTATIN CALCIUM 10 MG/1
TABLET, FILM COATED ORAL
Qty: 90 TABLET | Refills: 0 | OUTPATIENT
Start: 2020-12-21

## 2020-12-21 RX ORDER — FEBUXOSTAT 40 MG/1
TABLET, FILM COATED ORAL
Qty: 90 TABLET | Refills: 0 | OUTPATIENT
Start: 2020-12-21

## 2020-12-28 ENCOUNTER — LAB ENCOUNTER (OUTPATIENT)
Dept: LAB | Age: 55
End: 2020-12-28
Attending: FAMILY MEDICINE
Payer: COMMERCIAL

## 2020-12-28 ENCOUNTER — OFFICE VISIT (OUTPATIENT)
Dept: INTERNAL MEDICINE CLINIC | Facility: CLINIC | Age: 55
End: 2020-12-28
Payer: COMMERCIAL

## 2020-12-28 VITALS
RESPIRATION RATE: 12 BRPM | DIASTOLIC BLOOD PRESSURE: 80 MMHG | OXYGEN SATURATION: 100 % | TEMPERATURE: 99 F | HEART RATE: 76 BPM | SYSTOLIC BLOOD PRESSURE: 125 MMHG | BODY MASS INDEX: 27.62 KG/M2 | HEIGHT: 64.5 IN | WEIGHT: 163.75 LBS

## 2020-12-28 DIAGNOSIS — Z00.00 LABORATORY EXAM ORDERED AS PART OF ROUTINE GENERAL MEDICAL EXAMINATION: ICD-10-CM

## 2020-12-28 DIAGNOSIS — I10 ESSENTIAL HYPERTENSION, BENIGN: ICD-10-CM

## 2020-12-28 DIAGNOSIS — Z00.00 WELLNESS EXAMINATION: Primary | ICD-10-CM

## 2020-12-28 DIAGNOSIS — E78.00 PURE HYPERCHOLESTEROLEMIA: ICD-10-CM

## 2020-12-28 PROCEDURE — 3008F BODY MASS INDEX DOCD: CPT | Performed by: FAMILY MEDICINE

## 2020-12-28 PROCEDURE — 80053 COMPREHEN METABOLIC PANEL: CPT

## 2020-12-28 PROCEDURE — 3079F DIAST BP 80-89 MM HG: CPT | Performed by: FAMILY MEDICINE

## 2020-12-28 PROCEDURE — 3074F SYST BP LT 130 MM HG: CPT | Performed by: FAMILY MEDICINE

## 2020-12-28 PROCEDURE — 36415 COLL VENOUS BLD VENIPUNCTURE: CPT

## 2020-12-28 PROCEDURE — 99396 PREV VISIT EST AGE 40-64: CPT | Performed by: FAMILY MEDICINE

## 2020-12-28 PROCEDURE — 84153 ASSAY OF PSA TOTAL: CPT | Performed by: UROLOGY

## 2020-12-28 PROCEDURE — 81003 URINALYSIS AUTO W/O SCOPE: CPT

## 2020-12-28 PROCEDURE — 90471 IMMUNIZATION ADMIN: CPT | Performed by: FAMILY MEDICINE

## 2020-12-28 PROCEDURE — 84154 ASSAY OF PSA FREE: CPT | Performed by: UROLOGY

## 2020-12-28 PROCEDURE — 90686 IIV4 VACC NO PRSV 0.5 ML IM: CPT | Performed by: FAMILY MEDICINE

## 2020-12-28 PROCEDURE — 80061 LIPID PANEL: CPT

## 2020-12-28 PROCEDURE — 85025 COMPLETE CBC W/AUTO DIFF WBC: CPT

## 2020-12-28 RX ORDER — AMLODIPINE BESYLATE AND BENAZEPRIL HYDROCHLORIDE 5; 20 MG/1; MG/1
1 CAPSULE ORAL DAILY
Qty: 90 CAPSULE | Refills: 1 | Status: SHIPPED | OUTPATIENT
Start: 2020-12-28 | End: 2021-06-28

## 2020-12-28 RX ORDER — FEBUXOSTAT 40 MG/1
40 TABLET, FILM COATED ORAL DAILY
Qty: 90 TABLET | Refills: 1 | Status: SHIPPED | OUTPATIENT
Start: 2020-12-28 | End: 2021-07-01

## 2020-12-28 RX ORDER — ATORVASTATIN CALCIUM 10 MG/1
10 TABLET, FILM COATED ORAL NIGHTLY
Qty: 90 TABLET | Refills: 1 | Status: SHIPPED | OUTPATIENT
Start: 2020-12-28 | End: 2021-07-01

## 2020-12-28 NOTE — PROGRESS NOTES
Kunal Smith is a 50year old male who presents for a complete physical exam.   HPI:       Wt Readings from Last 6 Encounters:  12/28/20 : 163 lb 12 oz (74.3 kg)  12/27/19 : 164 lb 4 oz (74.5 kg)  08/23/19 : 162 lb 12 oz (73.8 kg)  07/10/19 : 160 lb ( cholesterol    • Stress    • Unspecified essential hypertension    • Wears glasses       Past Surgical History:   Procedure Laterality Date   • OTHER SURGICAL HISTORY      ganglion cyst rxn      Family History   Problem Relation Age of Onset   • Hypertensi is Body mass index is 27.67 kg/m². , recommended low fat diet and aerobic exercise 30 minutes three times weekly. Health maintenance, will check fasting Lipids, CMP, CBC and UA.  Flu shot    To call us w new insurance to get tested for ADD   The patient diane

## 2021-06-28 RX ORDER — AMLODIPINE BESYLATE AND BENAZEPRIL HYDROCHLORIDE 5; 20 MG/1; MG/1
CAPSULE ORAL
Qty: 30 CAPSULE | Refills: 1 | Status: SHIPPED | OUTPATIENT
Start: 2021-06-28 | End: 2021-07-01

## 2021-06-28 NOTE — TELEPHONE ENCOUNTER
Medication(s) to Refill:   Requested Prescriptions     Pending Prescriptions Disp Refills   • AMLODIPINE BESY-BENAZEPRIL HCL 5-20 MG Oral Cap [Pharmacy Med Name: AMLODIPINE-BENAZEPRIL 5-20 MG] 90 capsule 1     Sig: TAKE 1 CAPSULE BY MOUTH EVERY DAY       L

## 2021-07-01 ENCOUNTER — OFFICE VISIT (OUTPATIENT)
Dept: INTERNAL MEDICINE CLINIC | Facility: CLINIC | Age: 56
End: 2021-07-01
Payer: COMMERCIAL

## 2021-07-01 VITALS
BODY MASS INDEX: 27.66 KG/M2 | OXYGEN SATURATION: 98 % | SYSTOLIC BLOOD PRESSURE: 124 MMHG | WEIGHT: 164 LBS | HEIGHT: 64.5 IN | RESPIRATION RATE: 16 BRPM | DIASTOLIC BLOOD PRESSURE: 85 MMHG | TEMPERATURE: 98 F | HEART RATE: 92 BPM

## 2021-07-01 DIAGNOSIS — R74.8 ELEVATED LIVER ENZYMES: ICD-10-CM

## 2021-07-01 DIAGNOSIS — R97.20 ELEVATED PSA: ICD-10-CM

## 2021-07-01 DIAGNOSIS — E78.00 PURE HYPERCHOLESTEROLEMIA: ICD-10-CM

## 2021-07-01 DIAGNOSIS — I10 ESSENTIAL HYPERTENSION, BENIGN: Primary | ICD-10-CM

## 2021-07-01 DIAGNOSIS — M1A.9XX0 CHRONIC GOUT WITHOUT TOPHUS, UNSPECIFIED CAUSE, UNSPECIFIED SITE: ICD-10-CM

## 2021-07-01 PROCEDURE — 3074F SYST BP LT 130 MM HG: CPT | Performed by: FAMILY MEDICINE

## 2021-07-01 PROCEDURE — 3008F BODY MASS INDEX DOCD: CPT | Performed by: FAMILY MEDICINE

## 2021-07-01 PROCEDURE — 3079F DIAST BP 80-89 MM HG: CPT | Performed by: FAMILY MEDICINE

## 2021-07-01 PROCEDURE — 99214 OFFICE O/P EST MOD 30 MIN: CPT | Performed by: FAMILY MEDICINE

## 2021-07-01 RX ORDER — FEBUXOSTAT 40 MG/1
40 TABLET, FILM COATED ORAL DAILY
Qty: 90 TABLET | Refills: 1 | Status: SHIPPED | OUTPATIENT
Start: 2021-07-01 | End: 2022-01-03

## 2021-07-01 RX ORDER — ATORVASTATIN CALCIUM 10 MG/1
10 TABLET, FILM COATED ORAL NIGHTLY
Qty: 90 TABLET | Refills: 1 | Status: SHIPPED | OUTPATIENT
Start: 2021-07-01 | End: 2022-01-03

## 2021-07-01 RX ORDER — AMLODIPINE BESYLATE AND BENAZEPRIL HYDROCHLORIDE 5; 20 MG/1; MG/1
1 CAPSULE ORAL DAILY
Qty: 90 CAPSULE | Refills: 1 | Status: SHIPPED | OUTPATIENT
Start: 2021-07-01 | End: 2022-01-24

## 2021-07-01 NOTE — PROGRESS NOTES
Kunal Smith is a 50year old male. HPI:   Patient presents for recheck of his hypertension. Pt has been taking medications as instructed, no medication side effects, home BP monitoring in the range of 738'K systolic and 56'H diastolic.    Did get • Magnesium Citrate 200 MG Oral Tab Take 200 mg by mouth. • cholecalciferol (VITAMIN D3) 5000 units Oral Cap Take 5,000 Units by mouth daily. • Omega-3 1000 MG Oral Cap Take 1,000 mg by mouth daily.         Past Medical History:   Diagnosis Date

## 2021-11-08 ENCOUNTER — OFFICE VISIT (OUTPATIENT)
Dept: SURGERY | Facility: CLINIC | Age: 56
End: 2021-11-08
Payer: COMMERCIAL

## 2021-11-08 DIAGNOSIS — R82.90 URINE FINDING: Primary | ICD-10-CM

## 2021-11-08 DIAGNOSIS — R39.9 LOWER URINARY TRACT SYMPTOMS: ICD-10-CM

## 2021-11-08 DIAGNOSIS — R97.20 ELEVATED PSA: ICD-10-CM

## 2021-11-08 DIAGNOSIS — N40.0 ENLARGED PROSTATE: ICD-10-CM

## 2021-11-08 PROCEDURE — 81003 URINALYSIS AUTO W/O SCOPE: CPT | Performed by: UROLOGY

## 2021-11-08 PROCEDURE — 99213 OFFICE O/P EST LOW 20 MIN: CPT | Performed by: UROLOGY

## 2021-11-08 PROCEDURE — 51798 US URINE CAPACITY MEASURE: CPT | Performed by: UROLOGY

## 2021-11-08 RX ORDER — ALFUZOSIN HYDROCHLORIDE 10 MG/1
10 TABLET, EXTENDED RELEASE ORAL DAILY
Qty: 90 TABLET | Refills: 3 | Status: SHIPPED | OUTPATIENT
Start: 2021-11-08

## 2021-11-08 NOTE — PROGRESS NOTES
Rooming Clinician:     Deangelo Wilder is a 50year old male. Patient presents with: Follow - Up: elevated PSA        HPI:     Patient returns for follow-up examination.   Is a history of an enlarged prostate and elevated PSA prostate MRI showed volume Never Used    Vaping Use      Vaping Use: Never used    Alcohol use: No      Alcohol/week: 0.0 standard drinks    Drug use: No       REVIEW OF SYSTEMS:     GENERAL HEALTH: feels well otherwise  SKIN: denies any unusual skin lesions or rashes  RESPIRATORY: symptoms    PLAN:     Repeat PSA with free and total fraction  Begin alfuzosin 10 mg p.o. daily    I explained alpha blockers in detail to the patient.   I explained the rationale for using them in the treatment of lower urinary tract symptoms due to an enl

## 2022-01-03 RX ORDER — FEBUXOSTAT 40 MG/1
40 TABLET, FILM COATED ORAL DAILY
Qty: 30 TABLET | Refills: 0 | Status: SHIPPED | OUTPATIENT
Start: 2022-01-03 | End: 2022-01-24

## 2022-01-03 RX ORDER — ATORVASTATIN CALCIUM 10 MG/1
10 TABLET, FILM COATED ORAL NIGHTLY
Qty: 30 TABLET | Refills: 0 | Status: SHIPPED | OUTPATIENT
Start: 2022-01-03 | End: 2022-01-24

## 2022-01-24 ENCOUNTER — EKG ENCOUNTER (OUTPATIENT)
Dept: LAB | Age: 57
End: 2022-01-24
Attending: FAMILY MEDICINE
Payer: COMMERCIAL

## 2022-01-24 ENCOUNTER — OFFICE VISIT (OUTPATIENT)
Dept: INTERNAL MEDICINE CLINIC | Facility: CLINIC | Age: 57
End: 2022-01-24
Payer: COMMERCIAL

## 2022-01-24 VITALS
RESPIRATION RATE: 18 BRPM | WEIGHT: 167.19 LBS | TEMPERATURE: 99 F | DIASTOLIC BLOOD PRESSURE: 82 MMHG | BODY MASS INDEX: 28.54 KG/M2 | SYSTOLIC BLOOD PRESSURE: 125 MMHG | HEART RATE: 101 BPM | HEIGHT: 64 IN | OXYGEN SATURATION: 97 %

## 2022-01-24 DIAGNOSIS — Z00.00 LABORATORY EXAM ORDERED AS PART OF ROUTINE GENERAL MEDICAL EXAMINATION: ICD-10-CM

## 2022-01-24 DIAGNOSIS — Z00.00 WELLNESS EXAMINATION: Primary | ICD-10-CM

## 2022-01-24 LAB
ALBUMIN SERPL-MCNC: 4.4 G/DL (ref 3.4–5)
ALBUMIN/GLOB SERPL: 1.2 {RATIO} (ref 1–2)
ALP LIVER SERPL-CCNC: 92 U/L
ALT SERPL-CCNC: 58 U/L
ANION GAP SERPL CALC-SCNC: 5 MMOL/L (ref 0–18)
AST SERPL-CCNC: 23 U/L (ref 15–37)
BASOPHILS # BLD AUTO: 0.07 X10(3) UL (ref 0–0.2)
BASOPHILS NFR BLD AUTO: 1.1 %
BILIRUB SERPL-MCNC: 0.4 MG/DL (ref 0.1–2)
BUN BLD-MCNC: 14 MG/DL (ref 7–18)
CALCIUM BLD-MCNC: 9.6 MG/DL (ref 8.5–10.1)
CHLORIDE SERPL-SCNC: 107 MMOL/L (ref 98–112)
CHOLEST SERPL-MCNC: 153 MG/DL (ref ?–200)
CO2 SERPL-SCNC: 29 MMOL/L (ref 21–32)
CREAT BLD-MCNC: 1.01 MG/DL
EOSINOPHIL # BLD AUTO: 0.15 X10(3) UL (ref 0–0.7)
EOSINOPHIL NFR BLD AUTO: 2.3 %
ERYTHROCYTE [DISTWIDTH] IN BLOOD BY AUTOMATED COUNT: 12.9 %
FASTING PATIENT LIPID ANSWER: YES
FASTING STATUS PATIENT QL REPORTED: YES
GLOBULIN PLAS-MCNC: 3.7 G/DL (ref 2.8–4.4)
GLUCOSE BLD-MCNC: 104 MG/DL (ref 70–99)
HCT VFR BLD AUTO: 45.1 %
HDLC SERPL-MCNC: 47 MG/DL (ref 40–59)
HGB BLD-MCNC: 15 G/DL
IMM GRANULOCYTES # BLD AUTO: 0.02 X10(3) UL (ref 0–1)
IMM GRANULOCYTES NFR BLD: 0.3 %
LDLC SERPL CALC-MCNC: 80 MG/DL (ref ?–100)
LYMPHOCYTES # BLD AUTO: 2.59 X10(3) UL (ref 1–4)
LYMPHOCYTES NFR BLD AUTO: 39.2 %
MCH RBC QN AUTO: 29 PG (ref 26–34)
MCHC RBC AUTO-ENTMCNC: 33.3 G/DL (ref 31–37)
MCV RBC AUTO: 87.2 FL
MONOCYTES # BLD AUTO: 0.5 X10(3) UL (ref 0.1–1)
MONOCYTES NFR BLD AUTO: 7.6 %
NEUTROPHILS # BLD AUTO: 3.28 X10 (3) UL (ref 1.5–7.7)
NEUTROPHILS # BLD AUTO: 3.28 X10(3) UL (ref 1.5–7.7)
NEUTROPHILS NFR BLD AUTO: 49.5 %
NONHDLC SERPL-MCNC: 106 MG/DL (ref ?–130)
OSMOLALITY SERPL CALC.SUM OF ELEC: 293 MOSM/KG (ref 275–295)
PLATELET # BLD AUTO: 277 10(3)UL (ref 150–450)
POTASSIUM SERPL-SCNC: 4.2 MMOL/L (ref 3.5–5.1)
PROT SERPL-MCNC: 8.1 G/DL (ref 6.4–8.2)
RBC # BLD AUTO: 5.17 X10(6)UL
SODIUM SERPL-SCNC: 141 MMOL/L (ref 136–145)
TRIGL SERPL-MCNC: 147 MG/DL (ref 30–149)
VLDLC SERPL CALC-MCNC: 23 MG/DL (ref 0–30)
WBC # BLD AUTO: 6.6 X10(3) UL (ref 4–11)

## 2022-01-24 PROCEDURE — 80061 LIPID PANEL: CPT

## 2022-01-24 PROCEDURE — 80053 COMPREHEN METABOLIC PANEL: CPT

## 2022-01-24 PROCEDURE — 90686 IIV4 VACC NO PRSV 0.5 ML IM: CPT | Performed by: FAMILY MEDICINE

## 2022-01-24 PROCEDURE — 3079F DIAST BP 80-89 MM HG: CPT | Performed by: FAMILY MEDICINE

## 2022-01-24 PROCEDURE — 3074F SYST BP LT 130 MM HG: CPT | Performed by: FAMILY MEDICINE

## 2022-01-24 PROCEDURE — 99396 PREV VISIT EST AGE 40-64: CPT | Performed by: FAMILY MEDICINE

## 2022-01-24 PROCEDURE — 90471 IMMUNIZATION ADMIN: CPT | Performed by: FAMILY MEDICINE

## 2022-01-24 PROCEDURE — 85025 COMPLETE CBC W/AUTO DIFF WBC: CPT

## 2022-01-24 PROCEDURE — 3008F BODY MASS INDEX DOCD: CPT | Performed by: FAMILY MEDICINE

## 2022-01-24 PROCEDURE — 36415 COLL VENOUS BLD VENIPUNCTURE: CPT

## 2022-01-24 RX ORDER — ATORVASTATIN CALCIUM 10 MG/1
10 TABLET, FILM COATED ORAL NIGHTLY
Qty: 90 TABLET | Refills: 1 | Status: SHIPPED
Start: 2022-01-24 | End: 2022-01-28

## 2022-01-24 RX ORDER — FEBUXOSTAT 40 MG/1
40 TABLET, FILM COATED ORAL DAILY
Qty: 90 TABLET | Refills: 1 | Status: SHIPPED
Start: 2022-01-24 | End: 2022-01-28

## 2022-01-24 RX ORDER — AMLODIPINE BESYLATE AND BENAZEPRIL HYDROCHLORIDE 5; 20 MG/1; MG/1
1 CAPSULE ORAL DAILY
Qty: 90 CAPSULE | Refills: 1 | Status: SHIPPED
Start: 2022-01-24

## 2022-01-24 NOTE — PROGRESS NOTES
Juliette Farley is a 49year old male who presents for a complete physical exam.   HPI:       Wt Readings from Last 6 Encounters:  01/24/22 : 167 lb 3.2 oz (75.8 kg)  07/01/21 : 164 lb (74.4 kg)  12/28/20 : 163 lb 12 oz (74.3 kg)  12/27/19 : 164 lb 4 oz • Arthritis    • Essential hypertension, benign 8/14/2013   • Food intolerance    • Gout 8/14/2013   • High cholesterol    • Stress    • Unspecified essential hypertension    • Wears glasses       Past Surgical History:   Procedure Laterality Date   • OT maintenance, will check fasting Lipids, CMP, CBC and UA. BP controlled    Mitch 6 moThe patient indicates understanding of these issues and agrees to the plan. The patient is asked to return for CPX in 1yr.

## 2022-01-28 RX ORDER — FEBUXOSTAT 40 MG/1
TABLET, FILM COATED ORAL
Qty: 90 TABLET | Refills: 1 | Status: SHIPPED | OUTPATIENT
Start: 2022-01-28

## 2022-01-28 RX ORDER — ATORVASTATIN CALCIUM 10 MG/1
TABLET, FILM COATED ORAL
Qty: 90 TABLET | Refills: 1 | Status: SHIPPED | OUTPATIENT
Start: 2022-01-28

## 2022-01-28 NOTE — TELEPHONE ENCOUNTER
Pharmacy states that patient last picked up on 1/6/2022 for a 30 day supply.  They also stated they did not received the refill from 1/24/2022 so patient will need refills sent       Name from pharmacy: FEBUXOSTAT 40 MG TABLET          Will file in chart as

## 2022-02-14 RX ORDER — AMLODIPINE BESYLATE AND BENAZEPRIL HYDROCHLORIDE 5; 20 MG/1; MG/1
CAPSULE ORAL
Qty: 90 CAPSULE | Refills: 1 | Status: SHIPPED | OUTPATIENT
Start: 2022-02-14

## 2022-06-14 RX ORDER — FEBUXOSTAT 40 MG/1
40 TABLET, FILM COATED ORAL DAILY
Qty: 90 TABLET | Refills: 1 | Status: SHIPPED | OUTPATIENT
Start: 2022-06-14

## 2022-07-05 ENCOUNTER — TELEPHONE (OUTPATIENT)
Dept: INTERNAL MEDICINE CLINIC | Facility: CLINIC | Age: 57
End: 2022-07-05

## 2022-07-05 NOTE — TELEPHONE ENCOUNTER
Pt called stating he tested positive for Covid 3 days ago, he currently has a cough, headache, temp reading at 99, and lost his taste. Pt states he has been taking two tylenol before bed to help him sleep. Pt unsure what to do now, there is nothing available for this week and pt does not want to wait until next week for recs. Please advise.

## 2022-07-05 NOTE — TELEPHONE ENCOUNTER
Spoke with pt. Pt stated symptoms started 3 days ago. Fever, cough, congestion, sore throat, body aches. Today, temp was 98.7. still has sore throat and and cough but no more body aches. Stated he can just quarantine or monitor at home and use OTC mucinex, robitussin, tylenol/ibuprofen. Rest and push fluids. Informed pt of possible anti-viral medications and would need VV within 5 days of symptoms and nothing available this week. Informed him he could go to IC or ED to be examined and they may prescribe one for him. Pt v/u.

## 2022-07-27 RX ORDER — ATORVASTATIN CALCIUM 10 MG/1
TABLET, FILM COATED ORAL
Qty: 90 TABLET | Refills: 1 | Status: SHIPPED | OUTPATIENT
Start: 2022-07-27

## 2022-08-09 RX ORDER — AMLODIPINE BESYLATE AND BENAZEPRIL HYDROCHLORIDE 5; 20 MG/1; MG/1
1 CAPSULE ORAL DAILY
Qty: 90 CAPSULE | Refills: 0 | Status: SHIPPED | OUTPATIENT
Start: 2022-08-09

## 2022-08-09 NOTE — TELEPHONE ENCOUNTER
[facility] sent stating due for BP f/u.   courtesy refill sent    Amlodipine-benazepril 5-20 mg  Hypertension Medications Protocol Failed 08/09/2022 12:24 AM   Protocol Details  Appointment in past 6 or next 3 months   Filled 2-14-22  Qty 90  1 refill  No upcoming appt.   LOV 1-24-22

## 2022-08-22 ENCOUNTER — OFFICE VISIT (OUTPATIENT)
Dept: INTERNAL MEDICINE CLINIC | Facility: CLINIC | Age: 57
End: 2022-08-22
Payer: COMMERCIAL

## 2022-08-22 VITALS
BODY MASS INDEX: 28.57 KG/M2 | WEIGHT: 167.38 LBS | DIASTOLIC BLOOD PRESSURE: 78 MMHG | TEMPERATURE: 99 F | OXYGEN SATURATION: 97 % | HEIGHT: 64 IN | SYSTOLIC BLOOD PRESSURE: 128 MMHG | HEART RATE: 94 BPM

## 2022-08-22 DIAGNOSIS — E78.00 PURE HYPERCHOLESTEROLEMIA: Primary | ICD-10-CM

## 2022-08-22 DIAGNOSIS — M25.512 LEFT SHOULDER PAIN, UNSPECIFIED CHRONICITY: ICD-10-CM

## 2022-08-22 DIAGNOSIS — D22.9 NEVUS: ICD-10-CM

## 2022-08-22 DIAGNOSIS — I10 ESSENTIAL HYPERTENSION, BENIGN: ICD-10-CM

## 2022-08-22 DIAGNOSIS — R97.20 ELEVATED PSA: ICD-10-CM

## 2022-08-22 PROCEDURE — 99214 OFFICE O/P EST MOD 30 MIN: CPT | Performed by: FAMILY MEDICINE

## 2022-08-22 PROCEDURE — 3078F DIAST BP <80 MM HG: CPT | Performed by: FAMILY MEDICINE

## 2022-08-22 PROCEDURE — 3008F BODY MASS INDEX DOCD: CPT | Performed by: FAMILY MEDICINE

## 2022-08-22 PROCEDURE — 3074F SYST BP LT 130 MM HG: CPT | Performed by: FAMILY MEDICINE

## 2022-08-22 RX ORDER — AMLODIPINE BESYLATE AND BENAZEPRIL HYDROCHLORIDE 5; 20 MG/1; MG/1
1 CAPSULE ORAL DAILY
Qty: 90 CAPSULE | Refills: 0 | Status: SHIPPED | OUTPATIENT
Start: 2022-08-22

## 2022-08-22 RX ORDER — ATORVASTATIN CALCIUM 10 MG/1
10 TABLET, FILM COATED ORAL NIGHTLY
Qty: 90 TABLET | Refills: 1 | Status: SHIPPED | OUTPATIENT
Start: 2022-08-22

## 2022-09-12 RX ORDER — FEBUXOSTAT 40 MG/1
40 TABLET, FILM COATED ORAL DAILY
Qty: 90 TABLET | Refills: 1 | OUTPATIENT
Start: 2022-09-12

## 2022-10-11 RX ORDER — ALFUZOSIN HYDROCHLORIDE 10 MG/1
TABLET, EXTENDED RELEASE ORAL
Qty: 90 TABLET | Refills: 0 | Status: SHIPPED | OUTPATIENT
Start: 2022-10-11

## 2022-11-28 ENCOUNTER — OFFICE VISIT (OUTPATIENT)
Dept: INTERNAL MEDICINE CLINIC | Facility: CLINIC | Age: 57
End: 2022-11-28
Payer: COMMERCIAL

## 2022-11-28 ENCOUNTER — TELEPHONE (OUTPATIENT)
Dept: INTERNAL MEDICINE CLINIC | Facility: CLINIC | Age: 57
End: 2022-11-28

## 2022-11-28 VITALS
HEART RATE: 77 BPM | HEIGHT: 64 IN | OXYGEN SATURATION: 99 % | SYSTOLIC BLOOD PRESSURE: 130 MMHG | TEMPERATURE: 98 F | WEIGHT: 170.38 LBS | BODY MASS INDEX: 29.09 KG/M2 | DIASTOLIC BLOOD PRESSURE: 80 MMHG

## 2022-11-28 DIAGNOSIS — S09.93XA LIP INJURY, INITIAL ENCOUNTER: Primary | ICD-10-CM

## 2022-11-28 PROCEDURE — 3075F SYST BP GE 130 - 139MM HG: CPT | Performed by: FAMILY MEDICINE

## 2022-11-28 PROCEDURE — 3079F DIAST BP 80-89 MM HG: CPT | Performed by: FAMILY MEDICINE

## 2022-11-28 PROCEDURE — 3008F BODY MASS INDEX DOCD: CPT | Performed by: FAMILY MEDICINE

## 2022-11-28 PROCEDURE — 99213 OFFICE O/P EST LOW 20 MIN: CPT | Performed by: FAMILY MEDICINE

## 2022-11-28 NOTE — TELEPHONE ENCOUNTER
Appt scheduled.     Future Appointments   Date Time Provider Castillo Singh   11/28/2022  2:45 PM Maile Miranda MD [facility] [place]

## 2022-11-28 NOTE — TELEPHONE ENCOUNTER
TO: pt stated that 3 days ago he fell and bit the inside of his lip. Pt stated it bled at that time, but was able to stop the bleeding. Today he noticed the cut is swollen and there is white puss on the top of it. Pt denies fever, chills. Please advise, Set up OV/VV to be examined or other recs?

## 2022-11-28 NOTE — TELEPHONE ENCOUNTER
Patient calling to speak to nurse, looking for recommendations. Patient states he fell and got a cut under top lip and he thinks it is getting infected.

## 2022-11-29 ENCOUNTER — OFFICE VISIT (OUTPATIENT)
Dept: SURGERY | Facility: CLINIC | Age: 57
End: 2022-11-29
Payer: COMMERCIAL

## 2022-11-29 DIAGNOSIS — R97.20 ELEVATED PSA: Primary | ICD-10-CM

## 2022-11-29 DIAGNOSIS — R39.9 LOWER URINARY TRACT SYMPTOMS: ICD-10-CM

## 2022-11-29 DIAGNOSIS — N40.0 ENLARGED PROSTATE: ICD-10-CM

## 2022-11-29 PROCEDURE — 51798 US URINE CAPACITY MEASURE: CPT | Performed by: UROLOGY

## 2022-11-29 PROCEDURE — 99213 OFFICE O/P EST LOW 20 MIN: CPT | Performed by: UROLOGY

## 2022-11-29 RX ORDER — ALFUZOSIN HYDROCHLORIDE 10 MG/1
10 TABLET, EXTENDED RELEASE ORAL DAILY
Qty: 90 TABLET | Refills: 3 | Status: SHIPPED | OUTPATIENT
Start: 2022-11-29

## 2022-12-12 ENCOUNTER — LAB ENCOUNTER (OUTPATIENT)
Dept: LAB | Age: 57
End: 2022-12-12
Attending: UROLOGY
Payer: COMMERCIAL

## 2022-12-12 LAB
PSA FREE MFR SERPL: 17 %
PSA FREE SERPL-MCNC: 1.83 NG/ML
PSA SERPL-MCNC: 10.5 NG/ML (ref ?–4)

## 2022-12-12 PROCEDURE — 84154 ASSAY OF PSA FREE: CPT | Performed by: UROLOGY

## 2022-12-12 PROCEDURE — 36415 COLL VENOUS BLD VENIPUNCTURE: CPT | Performed by: UROLOGY

## 2022-12-12 PROCEDURE — 84153 ASSAY OF PSA TOTAL: CPT | Performed by: UROLOGY

## 2022-12-14 ENCOUNTER — TELEPHONE (OUTPATIENT)
Dept: SURGERY | Facility: CLINIC | Age: 57
End: 2022-12-14

## 2022-12-14 DIAGNOSIS — R97.20 ELEVATED PSA: Primary | ICD-10-CM

## 2022-12-14 NOTE — TELEPHONE ENCOUNTER
----- Message from Zachary Reyes MD sent at 12/14/2022 11:37 AM CST -----  Your recent PSA is abnormal.  It increased from 7 up to 10. Similar to what it was 2 years ago. Recommend a 4K score. This is a blood test that projects risk of high-grade prostate cancer. Because of your young age I would recommend obtaining this in about 3 months. He will need to  a specimen kit from our office. I will also schedule a follow-up examination with one of our other doctors in approximately 3 to 4 months to review the 4K score and your history since I will be retiring from the practice at the end of this year. Best wishes for good health for happiness into the future. .  Sincerely,  Amy Arana MD

## 2022-12-27 ENCOUNTER — PATIENT MESSAGE (OUTPATIENT)
Dept: SURGERY | Facility: CLINIC | Age: 57
End: 2022-12-27

## 2022-12-27 NOTE — TELEPHONE ENCOUNTER
From: Jacque Bo  Sent: 12/27/2022 11:01 AM CST  To: [facility] Clinical Staff  Subject: PSA test result    1. Could you please clarify what the kit is for? Is this related to the 4K Score test?  2. My insurance has already told us that the 4K Score test is not considered preventive. Therefore I will have to pay towards my deductible. Can you please provide us with an estimate for this blood test? Thank you.

## 2023-01-26 ENCOUNTER — LAB ENCOUNTER (OUTPATIENT)
Dept: LAB | Age: 58
End: 2023-01-26
Attending: FAMILY MEDICINE
Payer: COMMERCIAL

## 2023-01-26 ENCOUNTER — OFFICE VISIT (OUTPATIENT)
Dept: INTERNAL MEDICINE CLINIC | Facility: CLINIC | Age: 58
End: 2023-01-26
Payer: COMMERCIAL

## 2023-01-26 VITALS
SYSTOLIC BLOOD PRESSURE: 125 MMHG | DIASTOLIC BLOOD PRESSURE: 82 MMHG | BODY MASS INDEX: 28.82 KG/M2 | OXYGEN SATURATION: 98 % | WEIGHT: 168.81 LBS | HEART RATE: 78 BPM | HEIGHT: 64 IN | TEMPERATURE: 99 F

## 2023-01-26 DIAGNOSIS — Z00.00 LABORATORY EXAM ORDERED AS PART OF ROUTINE GENERAL MEDICAL EXAMINATION: ICD-10-CM

## 2023-01-26 DIAGNOSIS — Z00.00 WELLNESS EXAMINATION: ICD-10-CM

## 2023-01-26 DIAGNOSIS — Z00.00 WELLNESS EXAMINATION: Primary | ICD-10-CM

## 2023-01-26 LAB
ALBUMIN SERPL-MCNC: 4.2 G/DL (ref 3.4–5)
ALBUMIN/GLOB SERPL: 1.2 {RATIO} (ref 1–2)
ALP LIVER SERPL-CCNC: 77 U/L
ALT SERPL-CCNC: 43 U/L
ANION GAP SERPL CALC-SCNC: 5 MMOL/L (ref 0–18)
AST SERPL-CCNC: 23 U/L (ref 15–37)
BASOPHILS # BLD AUTO: 0.07 X10(3) UL (ref 0–0.2)
BASOPHILS NFR BLD AUTO: 1.1 %
BILIRUB SERPL-MCNC: 0.5 MG/DL (ref 0.1–2)
BILIRUB UR QL STRIP.AUTO: NEGATIVE
BUN BLD-MCNC: 11 MG/DL (ref 7–18)
CALCIUM BLD-MCNC: 9.5 MG/DL (ref 8.5–10.1)
CHLORIDE SERPL-SCNC: 107 MMOL/L (ref 98–112)
CHOLEST SERPL-MCNC: 149 MG/DL (ref ?–200)
CLARITY UR REFRACT.AUTO: CLEAR
CO2 SERPL-SCNC: 29 MMOL/L (ref 21–32)
COLOR UR AUTO: YELLOW
CREAT BLD-MCNC: 1.02 MG/DL
EOSINOPHIL # BLD AUTO: 0.16 X10(3) UL (ref 0–0.7)
EOSINOPHIL NFR BLD AUTO: 2.5 %
ERYTHROCYTE [DISTWIDTH] IN BLOOD BY AUTOMATED COUNT: 12.5 %
FASTING PATIENT LIPID ANSWER: YES
FASTING STATUS PATIENT QL REPORTED: YES
GFR SERPLBLD BASED ON 1.73 SQ M-ARVRAT: 87 ML/MIN/1.73M2 (ref 60–?)
GLOBULIN PLAS-MCNC: 3.6 G/DL (ref 2.8–4.4)
GLUCOSE BLD-MCNC: 109 MG/DL (ref 70–99)
GLUCOSE UR STRIP.AUTO-MCNC: NEGATIVE MG/DL
HCT VFR BLD AUTO: 44.7 %
HDLC SERPL-MCNC: 42 MG/DL (ref 40–59)
HGB BLD-MCNC: 14.8 G/DL
IMM GRANULOCYTES # BLD AUTO: 0.01 X10(3) UL (ref 0–1)
IMM GRANULOCYTES NFR BLD: 0.2 %
KETONES UR STRIP.AUTO-MCNC: NEGATIVE MG/DL
LDLC SERPL CALC-MCNC: 72 MG/DL (ref ?–100)
LEUKOCYTE ESTERASE UR QL STRIP.AUTO: NEGATIVE
LYMPHOCYTES # BLD AUTO: 2.61 X10(3) UL (ref 1–4)
LYMPHOCYTES NFR BLD AUTO: 40.3 %
MCH RBC QN AUTO: 28.6 PG (ref 26–34)
MCHC RBC AUTO-ENTMCNC: 33.1 G/DL (ref 31–37)
MCV RBC AUTO: 86.5 FL
MONOCYTES # BLD AUTO: 0.54 X10(3) UL (ref 0.1–1)
MONOCYTES NFR BLD AUTO: 8.3 %
NEUTROPHILS # BLD AUTO: 3.09 X10 (3) UL (ref 1.5–7.7)
NEUTROPHILS # BLD AUTO: 3.09 X10(3) UL (ref 1.5–7.7)
NEUTROPHILS NFR BLD AUTO: 47.6 %
NITRITE UR QL STRIP.AUTO: NEGATIVE
NONHDLC SERPL-MCNC: 107 MG/DL (ref ?–130)
OSMOLALITY SERPL CALC.SUM OF ELEC: 292 MOSM/KG (ref 275–295)
PH UR STRIP.AUTO: 6 [PH] (ref 5–8)
PLATELET # BLD AUTO: 263 10(3)UL (ref 150–450)
POTASSIUM SERPL-SCNC: 4.3 MMOL/L (ref 3.5–5.1)
PROT SERPL-MCNC: 7.8 G/DL (ref 6.4–8.2)
PROT UR STRIP.AUTO-MCNC: NEGATIVE MG/DL
RBC # BLD AUTO: 5.17 X10(6)UL
RBC UR QL AUTO: NEGATIVE
SODIUM SERPL-SCNC: 141 MMOL/L (ref 136–145)
SP GR UR STRIP.AUTO: 1.01 (ref 1–1.03)
TRIGL SERPL-MCNC: 213 MG/DL (ref 30–149)
UROBILINOGEN UR STRIP.AUTO-MCNC: 0.2 MG/DL
VLDLC SERPL CALC-MCNC: 33 MG/DL (ref 0–30)
WBC # BLD AUTO: 6.5 X10(3) UL (ref 4–11)

## 2023-01-26 PROCEDURE — 99396 PREV VISIT EST AGE 40-64: CPT | Performed by: FAMILY MEDICINE

## 2023-01-26 PROCEDURE — 81003 URINALYSIS AUTO W/O SCOPE: CPT

## 2023-01-26 PROCEDURE — 90715 TDAP VACCINE 7 YRS/> IM: CPT | Performed by: FAMILY MEDICINE

## 2023-01-26 PROCEDURE — 3079F DIAST BP 80-89 MM HG: CPT | Performed by: FAMILY MEDICINE

## 2023-01-26 PROCEDURE — 36415 COLL VENOUS BLD VENIPUNCTURE: CPT

## 2023-01-26 PROCEDURE — 3008F BODY MASS INDEX DOCD: CPT | Performed by: FAMILY MEDICINE

## 2023-01-26 PROCEDURE — 3074F SYST BP LT 130 MM HG: CPT | Performed by: FAMILY MEDICINE

## 2023-01-26 PROCEDURE — 80061 LIPID PANEL: CPT

## 2023-01-26 PROCEDURE — 90471 IMMUNIZATION ADMIN: CPT | Performed by: FAMILY MEDICINE

## 2023-01-26 PROCEDURE — 85025 COMPLETE CBC W/AUTO DIFF WBC: CPT

## 2023-01-26 PROCEDURE — 80053 COMPREHEN METABOLIC PANEL: CPT

## 2023-01-26 RX ORDER — FEBUXOSTAT 40 MG/1
40 TABLET, FILM COATED ORAL DAILY
Qty: 90 TABLET | Refills: 1 | Status: SHIPPED | OUTPATIENT
Start: 2023-01-26

## 2023-01-26 RX ORDER — ATORVASTATIN CALCIUM 10 MG/1
10 TABLET, FILM COATED ORAL NIGHTLY
Qty: 90 TABLET | Refills: 1 | Status: SHIPPED | OUTPATIENT
Start: 2023-01-26

## 2023-01-26 RX ORDER — AMLODIPINE BESYLATE AND BENAZEPRIL HYDROCHLORIDE 5; 20 MG/1; MG/1
1 CAPSULE ORAL DAILY
Qty: 90 CAPSULE | Refills: 1 | Status: SHIPPED | OUTPATIENT
Start: 2023-01-26

## 2023-08-09 NOTE — TELEPHONE ENCOUNTER
Spoke to wife- Vamshi Yousif, will pass message along. Due for BP f/u    Amlodipine-benazepril 5-20 mg  Hypertension Medications Protocol [id number]/09/2023 12:32 AM   Protocol Details Appointment in past 6 or next 3 months   Filled 1-26-23  Qty 90  1 refill  No upcoming appt.   LOV 1-26-23 TO

## 2023-08-11 RX ORDER — AMLODIPINE BESYLATE AND BENAZEPRIL HYDROCHLORIDE 5; 20 MG/1; MG/1
1 CAPSULE ORAL DAILY
Qty: 30 CAPSULE | Refills: 0 | Status: SHIPPED | OUTPATIENT
Start: 2023-08-11

## 2023-08-21 ENCOUNTER — OFFICE VISIT (OUTPATIENT)
Dept: INTERNAL MEDICINE CLINIC | Facility: CLINIC | Age: 58
End: 2023-08-21
Payer: COMMERCIAL

## 2023-08-21 ENCOUNTER — LAB ENCOUNTER (OUTPATIENT)
Dept: LAB | Age: 58
End: 2023-08-21
Attending: FAMILY MEDICINE
Payer: COMMERCIAL

## 2023-08-21 VITALS
DIASTOLIC BLOOD PRESSURE: 78 MMHG | WEIGHT: 165.38 LBS | BODY MASS INDEX: 28.24 KG/M2 | SYSTOLIC BLOOD PRESSURE: 118 MMHG | HEART RATE: 89 BPM | TEMPERATURE: 98 F | HEIGHT: 64 IN | OXYGEN SATURATION: 98 %

## 2023-08-21 DIAGNOSIS — E78.00 PURE HYPERCHOLESTEROLEMIA: ICD-10-CM

## 2023-08-21 DIAGNOSIS — R97.20 ELEVATED PSA: ICD-10-CM

## 2023-08-21 DIAGNOSIS — I10 ESSENTIAL HYPERTENSION, BENIGN: Primary | ICD-10-CM

## 2023-08-21 DIAGNOSIS — N52.9 ERECTILE DYSFUNCTION, UNSPECIFIED ERECTILE DYSFUNCTION TYPE: ICD-10-CM

## 2023-08-21 DIAGNOSIS — H93.13 TINNITUS OF BOTH EARS: ICD-10-CM

## 2023-08-21 LAB
PSA FREE MFR SERPL: 15 %
PSA FREE SERPL-MCNC: 1.64 NG/ML
PSA SERPL-MCNC: 11 NG/ML (ref ?–4)

## 2023-08-21 PROCEDURE — 84154 ASSAY OF PSA FREE: CPT

## 2023-08-21 PROCEDURE — 3078F DIAST BP <80 MM HG: CPT | Performed by: FAMILY MEDICINE

## 2023-08-21 PROCEDURE — 3074F SYST BP LT 130 MM HG: CPT | Performed by: FAMILY MEDICINE

## 2023-08-21 PROCEDURE — 3008F BODY MASS INDEX DOCD: CPT | Performed by: FAMILY MEDICINE

## 2023-08-21 PROCEDURE — 84153 ASSAY OF PSA TOTAL: CPT

## 2023-08-21 PROCEDURE — 36415 COLL VENOUS BLD VENIPUNCTURE: CPT

## 2023-08-21 PROCEDURE — 99214 OFFICE O/P EST MOD 30 MIN: CPT | Performed by: FAMILY MEDICINE

## 2023-08-21 RX ORDER — SILDENAFIL 100 MG/1
100 TABLET, FILM COATED ORAL
Qty: 30 TABLET | Refills: 5 | Status: SHIPPED | OUTPATIENT
Start: 2023-08-21

## 2023-08-21 RX ORDER — FEBUXOSTAT 40 MG/1
40 TABLET, FILM COATED ORAL DAILY
Qty: 90 TABLET | Refills: 1 | Status: SHIPPED | OUTPATIENT
Start: 2023-08-21

## 2023-08-21 RX ORDER — FEBUXOSTAT 40 MG/1
40 TABLET, FILM COATED ORAL DAILY
Qty: 90 TABLET | Refills: 1 | Status: SHIPPED | OUTPATIENT
Start: 2023-08-21 | End: 2023-08-21

## 2023-08-21 RX ORDER — ATORVASTATIN CALCIUM 10 MG/1
10 TABLET, FILM COATED ORAL NIGHTLY
Qty: 90 TABLET | Refills: 1 | Status: SHIPPED | OUTPATIENT
Start: 2023-08-21

## 2023-08-21 RX ORDER — AMLODIPINE BESYLATE AND BENAZEPRIL HYDROCHLORIDE 5; 20 MG/1; MG/1
1 CAPSULE ORAL DAILY
Qty: 90 CAPSULE | Refills: 1 | Status: SHIPPED | OUTPATIENT
Start: 2023-08-21

## 2023-08-21 RX ORDER — ALFUZOSIN HYDROCHLORIDE 10 MG/1
10 TABLET, EXTENDED RELEASE ORAL DAILY
Qty: 90 TABLET | Refills: 3 | Status: SHIPPED | OUTPATIENT
Start: 2023-08-21

## 2023-08-21 NOTE — TELEPHONE ENCOUNTER
Requesting   Name from pharmacy: FEBUXOSTAT 40 MG TABLET          Will file in chart as: FEBUXOSTAT 40 MG Oral Tab    Sig: TAKE 1 TABLET BY MOUTH EVERY DAY    Disp: 90 tablet    Refills: 1    Start: 8/21/2023    Class: Normal    Last ordered: 6 months ago by Jaci Paige MD Last refill: 5/24/2023    Rx #: [id number]     LOV: 1/26/2023  RTC: 6 months   Last Relevant Labs: n/a  Filled: 1/26/2023 #90 with 1 refills    Future Appointments   Date Time Provider Castillo Singh   8/21/2023  2:15 PM Freddy Fields MD [facility] [place]

## 2023-10-30 ENCOUNTER — OFFICE VISIT (OUTPATIENT)
Dept: INTERNAL MEDICINE CLINIC | Facility: CLINIC | Age: 58
End: 2023-10-30

## 2023-10-30 ENCOUNTER — HOSPITAL ENCOUNTER (OUTPATIENT)
Dept: CT IMAGING | Facility: HOSPITAL | Age: 58
Discharge: HOME | End: 2023-10-30
Attending: FAMILY MEDICINE
Payer: COMMERCIAL

## 2023-10-30 VITALS
TEMPERATURE: 98 F | BODY MASS INDEX: 27.35 KG/M2 | WEIGHT: 160.19 LBS | SYSTOLIC BLOOD PRESSURE: 138 MMHG | DIASTOLIC BLOOD PRESSURE: 82 MMHG | HEART RATE: 75 BPM | OXYGEN SATURATION: 98 % | HEIGHT: 64 IN

## 2023-10-30 DIAGNOSIS — R10.31 RLQ ABDOMINAL PAIN: ICD-10-CM

## 2023-10-30 DIAGNOSIS — R10.31 RLQ ABDOMINAL PAIN: Primary | ICD-10-CM

## 2023-10-30 LAB
CREAT BLD-MCNC: 1 MG/DL
EGFRCR SERPLBLD CKD-EPI 2021: 89 ML/MIN/1.73M2 (ref 60–?)

## 2023-10-30 PROCEDURE — 99213 OFFICE O/P EST LOW 20 MIN: CPT | Performed by: FAMILY MEDICINE

## 2023-10-30 PROCEDURE — 82565 ASSAY OF CREATININE: CPT

## 2023-10-30 PROCEDURE — 3008F BODY MASS INDEX DOCD: CPT | Performed by: FAMILY MEDICINE

## 2023-10-30 PROCEDURE — 3079F DIAST BP 80-89 MM HG: CPT | Performed by: FAMILY MEDICINE

## 2023-10-30 PROCEDURE — 3075F SYST BP GE 130 - 139MM HG: CPT | Performed by: FAMILY MEDICINE

## 2023-10-30 PROCEDURE — 74177 CT ABD & PELVIS W/CONTRAST: CPT | Performed by: FAMILY MEDICINE

## 2023-10-31 DIAGNOSIS — R10.31 RLQ ABDOMINAL PAIN: Primary | ICD-10-CM

## 2023-10-31 DIAGNOSIS — R93.5 ABNORMAL CT OF THE ABDOMEN: ICD-10-CM

## 2023-11-13 PROBLEM — R10.31 ABDOMINAL PAIN, RIGHT LOWER QUADRANT: Status: ACTIVE | Noted: 2023-11-13

## 2023-11-13 PROBLEM — K57.90 DIVERTICULOSIS: Status: ACTIVE | Noted: 2023-11-13

## 2024-03-20 RX ORDER — AMLODIPINE BESYLATE AND BENAZEPRIL HYDROCHLORIDE 5; 20 MG/1; MG/1
1 CAPSULE ORAL DAILY
Qty: 90 CAPSULE | Refills: 0 | Status: SHIPPED | OUTPATIENT
Start: 2024-03-20

## 2024-06-19 RX ORDER — AMLODIPINE BESYLATE AND BENAZEPRIL HYDROCHLORIDE 5; 20 MG/1; MG/1
1 CAPSULE ORAL DAILY
Qty: 30 CAPSULE | Refills: 0 | Status: SHIPPED | OUTPATIENT
Start: 2024-06-19

## 2024-06-19 NOTE — TELEPHONE ENCOUNTER
Requesting    Name from pharmacy: AMLODIPINE-BENAZEPRIL 5-20 MG         Will file in chart as: AMLODIPINE BESY-BENAZEPRIL HCL 5-20 MG Oral Cap    Sig: Take 1 capsule by mouth daily. DUE FOR AN APPOINTMENT    Disp: 90 capsule    Refills: 0 (Pharmacy requested: Not specified)    Start: 6/18/2024    Class: Normal    Non-formulary    Last ordered: 3 months ago (3/20/2024) by Darlene Bryson MD    Last refill: 3/20/2024    Rx #: [id number]    Hypertension Medications Protocol [id number]/18/2024 12:19 AM   Protocol Details CMP or BMP in past 12 months    Last BP reading less than 140/90    In person appointment or virtual visit in the past 12 mos or appointment in next 3 mos    EGFRCR or GFRNAA > 50        LOV: 10/30/2023  RTC: none noted   Last Relevant Labs: 1/26/2023  Filled: 3/20/2024 #90 with 0 refills    No future appointments.

## 2024-07-19 ENCOUNTER — LAB ENCOUNTER (OUTPATIENT)
Dept: LAB | Age: 59
End: 2024-07-19
Attending: FAMILY MEDICINE
Payer: COMMERCIAL

## 2024-07-19 ENCOUNTER — OFFICE VISIT (OUTPATIENT)
Dept: INTERNAL MEDICINE CLINIC | Facility: CLINIC | Age: 59
End: 2024-07-19
Payer: COMMERCIAL

## 2024-07-19 VITALS
BODY MASS INDEX: 28.2 KG/M2 | TEMPERATURE: 98 F | SYSTOLIC BLOOD PRESSURE: 122 MMHG | HEART RATE: 75 BPM | DIASTOLIC BLOOD PRESSURE: 82 MMHG | WEIGHT: 165.19 LBS | OXYGEN SATURATION: 98 % | HEIGHT: 64 IN

## 2024-07-19 DIAGNOSIS — I10 ESSENTIAL HYPERTENSION, BENIGN: ICD-10-CM

## 2024-07-19 DIAGNOSIS — E78.00 PURE HYPERCHOLESTEROLEMIA: ICD-10-CM

## 2024-07-19 DIAGNOSIS — I10 ESSENTIAL HYPERTENSION, BENIGN: Primary | ICD-10-CM

## 2024-07-19 DIAGNOSIS — R97.20 ELEVATED PSA: ICD-10-CM

## 2024-07-19 DIAGNOSIS — R35.0 URINARY FREQUENCY: ICD-10-CM

## 2024-07-19 DIAGNOSIS — M1A.9XX0 CHRONIC GOUT WITHOUT TOPHUS, UNSPECIFIED CAUSE, UNSPECIFIED SITE: ICD-10-CM

## 2024-07-19 DIAGNOSIS — Z71.85 VACCINE COUNSELING: ICD-10-CM

## 2024-07-19 PROBLEM — R10.31 ABDOMINAL PAIN, RIGHT LOWER QUADRANT: Status: RESOLVED | Noted: 2023-11-13 | Resolved: 2024-07-19

## 2024-07-19 PROBLEM — Z12.11 SPECIAL SCREENING FOR MALIGNANT NEOPLASM OF COLON: Status: RESOLVED | Noted: 2019-08-09 | Resolved: 2024-07-19

## 2024-07-19 PROBLEM — K57.90 DIVERTICULOSIS: Status: RESOLVED | Noted: 2023-11-13 | Resolved: 2024-07-19

## 2024-07-19 LAB
ALBUMIN SERPL-MCNC: 4.8 G/DL (ref 3.2–4.8)
ALBUMIN/GLOB SERPL: 1.4 {RATIO} (ref 1–2)
ALP LIVER SERPL-CCNC: 82 U/L
ALT SERPL-CCNC: 32 U/L
ANION GAP SERPL CALC-SCNC: 5 MMOL/L (ref 0–18)
AST SERPL-CCNC: 25 U/L (ref ?–34)
BILIRUB SERPL-MCNC: 0.6 MG/DL (ref 0.3–1.2)
BILIRUB UR QL: NEGATIVE
BUN BLD-MCNC: 11 MG/DL (ref 9–23)
BUN/CREAT SERPL: 9.8 (ref 10–20)
CALCIUM BLD-MCNC: 10.1 MG/DL (ref 8.7–10.4)
CHLORIDE SERPL-SCNC: 108 MMOL/L (ref 98–112)
CHOLEST SERPL-MCNC: 179 MG/DL (ref ?–200)
CLARITY UR: CLEAR
CO2 SERPL-SCNC: 28 MMOL/L (ref 21–32)
COMPLEXED PSA SERPL-MCNC: 10.78 NG/ML (ref ?–4)
CREAT BLD-MCNC: 1.12 MG/DL
EGFRCR SERPLBLD CKD-EPI 2021: 77 ML/MIN/1.73M2 (ref 60–?)
FASTING PATIENT LIPID ANSWER: YES
FASTING STATUS PATIENT QL REPORTED: YES
GLOBULIN PLAS-MCNC: 3.4 G/DL (ref 2–3.5)
GLUCOSE BLD-MCNC: 109 MG/DL (ref 70–99)
GLUCOSE UR-MCNC: NORMAL MG/DL
HDLC SERPL-MCNC: 48 MG/DL (ref 40–59)
HGB UR QL STRIP.AUTO: NEGATIVE
KETONES UR-MCNC: NEGATIVE MG/DL
LDLC SERPL CALC-MCNC: 99 MG/DL (ref ?–100)
LEUKOCYTE ESTERASE UR QL STRIP.AUTO: NEGATIVE
NITRITE UR QL STRIP.AUTO: NEGATIVE
NONHDLC SERPL-MCNC: 131 MG/DL (ref ?–130)
OSMOLALITY SERPL CALC.SUM OF ELEC: 292 MOSM/KG (ref 275–295)
PH UR: 6 [PH] (ref 5–8)
POTASSIUM SERPL-SCNC: 4.7 MMOL/L (ref 3.5–5.1)
PROT SERPL-MCNC: 8.2 G/DL (ref 5.7–8.2)
PROT UR-MCNC: NEGATIVE MG/DL
SODIUM SERPL-SCNC: 141 MMOL/L (ref 136–145)
SP GR UR STRIP: <1.005 (ref 1–1.03)
TRIGL SERPL-MCNC: 186 MG/DL (ref 30–149)
UROBILINOGEN UR STRIP-ACNC: NORMAL
VLDLC SERPL CALC-MCNC: 31 MG/DL (ref 0–30)

## 2024-07-19 PROCEDURE — 80053 COMPREHEN METABOLIC PANEL: CPT

## 2024-07-19 PROCEDURE — 36415 COLL VENOUS BLD VENIPUNCTURE: CPT

## 2024-07-19 PROCEDURE — 80061 LIPID PANEL: CPT

## 2024-07-19 PROCEDURE — 99214 OFFICE O/P EST MOD 30 MIN: CPT | Performed by: FAMILY MEDICINE

## 2024-07-19 PROCEDURE — 81003 URINALYSIS AUTO W/O SCOPE: CPT

## 2024-07-19 RX ORDER — FEBUXOSTAT 40 MG/1
40 TABLET, FILM COATED ORAL DAILY
Qty: 90 TABLET | Refills: 1 | Status: SHIPPED | OUTPATIENT
Start: 2024-07-19

## 2024-07-19 RX ORDER — ATORVASTATIN CALCIUM 10 MG/1
10 TABLET, FILM COATED ORAL NIGHTLY
Qty: 90 TABLET | Refills: 1 | Status: SHIPPED | OUTPATIENT
Start: 2024-07-19

## 2024-07-19 RX ORDER — ALFUZOSIN HYDROCHLORIDE 10 MG/1
10 TABLET, EXTENDED RELEASE ORAL DAILY
Qty: 90 TABLET | Refills: 1 | Status: SHIPPED | OUTPATIENT
Start: 2024-07-19

## 2024-07-19 RX ORDER — AMLODIPINE BESYLATE AND BENAZEPRIL HYDROCHLORIDE 5; 20 MG/1; MG/1
1 CAPSULE ORAL DAILY
Qty: 90 CAPSULE | Refills: 1 | Status: SHIPPED | OUTPATIENT
Start: 2024-07-19

## 2024-07-19 NOTE — PROGRESS NOTES
Hero Bo is a 58 year old male.  HPI:   Here for med check   overall doing OK    On meds as directed     Does drink a lot of water   up to 70 oz and does urinate often   no nocturia    Has not seen urology since Dr Huang retired     on the prostate med he gave    has elevated PSA    Occ BP check  120-130/80-90 range    breathing is good   no CP   Not on the PPI  no HB or belching     Gout med doing well still      Needs refills      Asking about shingles vaccine        Current Outpatient Medications   Medication Sig Dispense Refill    amLODIPine Besy-Benazepril HCl 5-20 MG Oral Cap Take 1 capsule by mouth daily. DUE FOR AN APPOINTMENT 30 capsule 0    alfuzosin ER 10 MG Oral Tablet 24 Hr Take 1 tablet (10 mg total) by mouth daily. 90 tablet 3    atorvastatin 10 MG Oral Tab Take 1 tablet (10 mg total) by mouth nightly. 90 tablet 1    febuxostat 40 MG Oral Tab Take 1 tablet (40 mg total) by mouth daily. 90 tablet 1    Sildenafil Citrate 100 MG Oral Tab Take 1 tablet (100 mg total) by mouth daily as needed for Erectile Dysfunction. 30 tablet 5    Multiple Vitamins-Minerals (MULTIVITAMIN ADULT OR) Take by mouth daily.      Zinc Gluconate 30 MG Oral Tab Take 30 mg by mouth daily.      Magnesium Citrate 200 MG Oral Tab Take 200 mg by mouth 2 (two) times a day.      Vitamin D3, Cholecalciferol, 125 MCG (5000 UT) Oral Cap Take 1 capsule (5,000 Units total) by mouth daily.      omeprazole 20 MG Oral Capsule Delayed Release Take one capsule (20 mg total) by mouth once daily, 30 minutes prior to breakfast. (Patient not taking: Reported on 7/19/2024) 90 capsule 3    Omega-3 1000 MG Oral Cap Take 1,000 mg by mouth daily. (Patient not taking: Reported on 7/19/2024)        Past Medical History:    Abdominal pain    Arthritis    Essential hypertension, benign    Food intolerance    Gout    High cholesterol    Stress    Unspecified essential hypertension    Wears glasses      Social History:  Social History      Socioeconomic History    Marital status:    Tobacco Use    Smoking status: Former     Current packs/day: 0.00     Types: Cigarettes     Quit date: 2001     Years since quittin.5    Smokeless tobacco: Never   Vaping Use    Vaping status: Never Used   Substance and Sexual Activity    Alcohol use: No     Alcohol/week: 0.0 standard drinks of alcohol    Drug use: No   Other Topics Concern    Caffeine Concern Yes     Comment: 2 cups daily    Exercise Yes     Comment: 2-3x/week        REVIEW OF SYSTEMS:   GENERAL HEALTH: feels well otherwise  SKIN: denies rashes  RESPIRATORY: denies shortness of breath  CARDIOVASCULAR: denies chest pain   GI: denies abdominal pain  NEURO: denies headaches    EXAM:   /82   Pulse 75   Temp 97.8 °F (36.6 °C) (Temporal)   Ht 5' 4\" (1.626 m)   Wt 165 lb 3.2 oz (74.9 kg)   SpO2 98%   BMI 28.36 kg/m²   GENERAL: well developed, well nourished,in no apparent distress   124/78  SKIN: no rashes  NECK: supple,no adenopathy  LUNGS: clear to auscultation  CARDIO: RRR without murmur  EXTREMITIES: no edema    ASSESSMENT AND PLAN:   1. Essential hypertension, benign  BP good  CPM w meds   check labs today     - Comp Metabolic Panel (14); Future  - Urinalysis with Culture Reflex; Future    2. Pure hypercholesterolemia  Check  lipids   on meds     - Lipid Panel; Future    3. Elevated PSA  Check PSA today      - PSA Total, Screen; Future    4. Urinary frequency  Suspect d/t increased intake    check UA        5. Chronic gout without tophus, unspecified cause, unspecified site  Appears stable w meds   refilled        6. Vaccine counseling  To RTC for shingrix         The patient indicates understanding of these issues and agrees to the plan.  .

## 2024-07-26 DIAGNOSIS — R97.20 ELEVATED PSA: Primary | ICD-10-CM

## 2025-01-10 NOTE — TELEPHONE ENCOUNTER
Requesting    Name from pharmacy: FEBUXOSTAT 40 MG TABLET         Will file in chart as: FEBUXOSTAT 40 MG Oral Tab    Sig: TAKE 1 TABLET BY MOUTH EVERY DAY    Disp: 90 tablet    Refills: 1    Start: 1/10/2025    Class: Normal    Non-formulary    Last ordered: 5 months ago (7/19/2024) by Darlene Bryson MD    Last refill: 10/15/2024    Rx #: [id number]        Name from pharmacy: ATORVASTATIN 10 MG TABLET         Will file in chart as: ATORVASTATIN 10 MG Oral Tab    Sig: TAKE 1 TABLET BY MOUTH EVERY DAY AT NIGHT    Disp: 90 tablet    Refills: 1    Start: 1/10/2025    Class: Normal    Last ordered: 5 months ago (7/19/2024) by Darlene Bryson MD    Last refill: 10/15/2024    Rx #: [id number]    Cholesterol Medication Protocol Vlcubp43/10/2025 12:16 AM   Protocol Details ALT < 80    ALT resulted within past year    Lipid panel within past 12 months    In person appointment or virtual visit in the past 12 mos or appointment in next 3 mos    Medication is active on med list       Name from pharmacy: AMLODIPINE-BENAZEPRIL 5-20 MG         Will file in chart as: AMLODIPINE BESY-BENAZEPRIL HCL 5-20 MG Oral Cap    Sig: Take 1 capsule by mouth daily. DUE FOR AN APPOINTMENT    Disp: 90 capsule    Refills: 1    Start: 1/10/2025    Class: Normal    Last ordered: 5 months ago (7/19/2024) by Darlene Bryson MD    Last refill: 10/15/2024    Rx #: [id number]    Hypertension Medications Protocol Athqyg53/10/2025 12:16 AM   Protocol Details CMP or BMP in past 12 months    Last BP reading less than 140/90    In person appointment or virtual visit in the past 12 mos or appointment in next 3 mos    EGFRCR or GFRNAA > 50    Medication is active on med list           LOV: 7/19/2024  RTC: none noted   Last Relevant Labs: 7/19/2024  Filled: 7/19/2024 #90 with 1 refills    No future appointments.

## 2025-01-13 RX ORDER — ATORVASTATIN CALCIUM 10 MG/1
10 TABLET, FILM COATED ORAL NIGHTLY
Qty: 30 TABLET | Refills: 0 | Status: SHIPPED | OUTPATIENT
Start: 2025-01-13

## 2025-01-13 RX ORDER — FEBUXOSTAT 40 MG/1
40 TABLET, FILM COATED ORAL DAILY
Qty: 30 TABLET | Refills: 0 | Status: SHIPPED | OUTPATIENT
Start: 2025-01-13

## 2025-01-13 RX ORDER — AMLODIPINE AND BENAZEPRIL HYDROCHLORIDE 5; 20 MG/1; MG/1
1 CAPSULE ORAL DAILY
Qty: 30 CAPSULE | Refills: 0 | Status: SHIPPED | OUTPATIENT
Start: 2025-01-13

## 2025-02-11 NOTE — TELEPHONE ENCOUNTER
[facility] sent to pt to schedule appt for CPX and f/u     Amlodipine-benazepril 5-20 mg  Filled 1-13-25  Qty 30  0 refills  No future appointments.  LOV 7-19-24 TO    Atorvastatin 10 mg  Filled 1-13-25  Qty 30  0 refills  No future appointments.  LOV 7-19-24 TO    Feuxostat 40 mg  Filled 1-13-25  Qty 30  0 refills  No future appointments.  LOV 7-19-24 TO

## 2025-02-13 RX ORDER — FEBUXOSTAT 40 MG/1
40 TABLET, FILM COATED ORAL DAILY
Qty: 30 TABLET | Refills: 0 | Status: SHIPPED | OUTPATIENT
Start: 2025-02-13

## 2025-02-13 RX ORDER — ATORVASTATIN CALCIUM 10 MG/1
10 TABLET, FILM COATED ORAL NIGHTLY
Qty: 30 TABLET | Refills: 0 | Status: SHIPPED | OUTPATIENT
Start: 2025-02-13

## 2025-02-13 RX ORDER — AMLODIPINE AND BENAZEPRIL HYDROCHLORIDE 5; 20 MG/1; MG/1
1 CAPSULE ORAL DAILY
Qty: 30 CAPSULE | Refills: 0 | Status: SHIPPED | OUTPATIENT
Start: 2025-02-13

## 2025-02-24 ENCOUNTER — LAB ENCOUNTER (OUTPATIENT)
Dept: LAB | Age: 60
End: 2025-02-24
Attending: FAMILY MEDICINE
Payer: COMMERCIAL

## 2025-02-24 ENCOUNTER — OFFICE VISIT (OUTPATIENT)
Dept: INTERNAL MEDICINE CLINIC | Facility: CLINIC | Age: 60
End: 2025-02-24
Payer: COMMERCIAL

## 2025-02-24 VITALS
SYSTOLIC BLOOD PRESSURE: 138 MMHG | HEIGHT: 64 IN | BODY MASS INDEX: 28.79 KG/M2 | WEIGHT: 168.63 LBS | OXYGEN SATURATION: 98 % | TEMPERATURE: 98 F | DIASTOLIC BLOOD PRESSURE: 84 MMHG | HEART RATE: 87 BPM

## 2025-02-24 DIAGNOSIS — Z00.00 LABORATORY EXAM ORDERED AS PART OF ROUTINE GENERAL MEDICAL EXAMINATION: ICD-10-CM

## 2025-02-24 DIAGNOSIS — I10 ESSENTIAL HYPERTENSION, BENIGN: ICD-10-CM

## 2025-02-24 DIAGNOSIS — M54.2 POSTERIOR NECK PAIN: ICD-10-CM

## 2025-02-24 DIAGNOSIS — Z00.00 WELLNESS EXAMINATION: Primary | ICD-10-CM

## 2025-02-24 LAB
ALBUMIN SERPL-MCNC: 4.8 G/DL (ref 3.2–4.8)
ALBUMIN/GLOB SERPL: 1.5 {RATIO} (ref 1–2)
ALP LIVER SERPL-CCNC: 72 U/L
ALT SERPL-CCNC: 42 U/L
ANION GAP SERPL CALC-SCNC: 6 MMOL/L (ref 0–18)
AST SERPL-CCNC: 32 U/L (ref ?–34)
BASOPHILS # BLD AUTO: 0.06 X10(3) UL (ref 0–0.2)
BASOPHILS NFR BLD AUTO: 0.8 %
BILIRUB SERPL-MCNC: 0.6 MG/DL (ref 0.3–1.2)
BILIRUB UR QL STRIP.AUTO: NEGATIVE
BUN BLD-MCNC: 12 MG/DL (ref 9–23)
CALCIUM BLD-MCNC: 9.5 MG/DL (ref 8.7–10.6)
CHLORIDE SERPL-SCNC: 104 MMOL/L (ref 98–112)
CHOLEST SERPL-MCNC: 183 MG/DL (ref ?–200)
CLARITY UR REFRACT.AUTO: CLEAR
CO2 SERPL-SCNC: 29 MMOL/L (ref 21–32)
COLOR UR AUTO: COLORLESS
COMPLEXED PSA SERPL-MCNC: 9.75 NG/ML (ref ?–4)
CREAT BLD-MCNC: 0.99 MG/DL
EGFRCR SERPLBLD CKD-EPI 2021: 89 ML/MIN/1.73M2 (ref 60–?)
EOSINOPHIL # BLD AUTO: 0.19 X10(3) UL (ref 0–0.7)
EOSINOPHIL NFR BLD AUTO: 2.6 %
ERYTHROCYTE [DISTWIDTH] IN BLOOD BY AUTOMATED COUNT: 12.8 %
FASTING PATIENT LIPID ANSWER: YES
FASTING STATUS PATIENT QL REPORTED: YES
GLOBULIN PLAS-MCNC: 3.1 G/DL (ref 2–3.5)
GLUCOSE BLD-MCNC: 92 MG/DL (ref 70–99)
GLUCOSE UR STRIP.AUTO-MCNC: NORMAL MG/DL
HCT VFR BLD AUTO: 43.8 %
HDLC SERPL-MCNC: 45 MG/DL (ref 40–59)
HGB BLD-MCNC: 14.8 G/DL
IMM GRANULOCYTES # BLD AUTO: 0.01 X10(3) UL (ref 0–1)
IMM GRANULOCYTES NFR BLD: 0.1 %
KETONES UR STRIP.AUTO-MCNC: NEGATIVE MG/DL
LDLC SERPL CALC-MCNC: 113 MG/DL (ref ?–100)
LEUKOCYTE ESTERASE UR QL STRIP.AUTO: NEGATIVE
LYMPHOCYTES # BLD AUTO: 2.33 X10(3) UL (ref 1–4)
LYMPHOCYTES NFR BLD AUTO: 32.3 %
MCH RBC QN AUTO: 28.7 PG (ref 26–34)
MCHC RBC AUTO-ENTMCNC: 33.8 G/DL (ref 31–37)
MCV RBC AUTO: 84.9 FL
MONOCYTES # BLD AUTO: 0.55 X10(3) UL (ref 0.1–1)
MONOCYTES NFR BLD AUTO: 7.6 %
NEUTROPHILS # BLD AUTO: 4.07 X10 (3) UL (ref 1.5–7.7)
NEUTROPHILS # BLD AUTO: 4.07 X10(3) UL (ref 1.5–7.7)
NEUTROPHILS NFR BLD AUTO: 56.6 %
NITRITE UR QL STRIP.AUTO: NEGATIVE
NONHDLC SERPL-MCNC: 138 MG/DL (ref ?–130)
OSMOLALITY SERPL CALC.SUM OF ELEC: 287 MOSM/KG (ref 275–295)
PH UR STRIP.AUTO: 6 [PH] (ref 5–8)
PLATELET # BLD AUTO: 260 10(3)UL (ref 150–450)
POTASSIUM SERPL-SCNC: 4.2 MMOL/L (ref 3.5–5.1)
PROT SERPL-MCNC: 7.9 G/DL (ref 5.7–8.2)
PROT UR STRIP.AUTO-MCNC: NEGATIVE MG/DL
RBC # BLD AUTO: 5.16 X10(6)UL
RBC UR QL AUTO: NEGATIVE
SODIUM SERPL-SCNC: 139 MMOL/L (ref 136–145)
SP GR UR STRIP.AUTO: 1.01 (ref 1–1.03)
TRIGL SERPL-MCNC: 142 MG/DL (ref 30–149)
UROBILINOGEN UR STRIP.AUTO-MCNC: NORMAL MG/DL
VLDLC SERPL CALC-MCNC: 24 MG/DL (ref 0–30)
WBC # BLD AUTO: 7.2 X10(3) UL (ref 4–11)

## 2025-02-24 PROCEDURE — 81003 URINALYSIS AUTO W/O SCOPE: CPT

## 2025-02-24 PROCEDURE — 80061 LIPID PANEL: CPT

## 2025-02-24 PROCEDURE — 85025 COMPLETE CBC W/AUTO DIFF WBC: CPT

## 2025-02-24 PROCEDURE — 36415 COLL VENOUS BLD VENIPUNCTURE: CPT

## 2025-02-24 PROCEDURE — 80053 COMPREHEN METABOLIC PANEL: CPT

## 2025-02-24 RX ORDER — AMLODIPINE AND BENAZEPRIL HYDROCHLORIDE 5; 20 MG/1; MG/1
1 CAPSULE ORAL DAILY
Qty: 90 CAPSULE | Refills: 1 | Status: SHIPPED | OUTPATIENT
Start: 2025-02-24

## 2025-02-24 RX ORDER — FEBUXOSTAT 40 MG/1
40 TABLET, FILM COATED ORAL DAILY
Qty: 90 TABLET | Refills: 1 | Status: SHIPPED | OUTPATIENT
Start: 2025-02-24

## 2025-02-24 RX ORDER — ATORVASTATIN CALCIUM 10 MG/1
10 TABLET, FILM COATED ORAL NIGHTLY
Qty: 90 TABLET | Refills: 1 | Status: SHIPPED | OUTPATIENT
Start: 2025-02-24

## 2025-02-24 RX ORDER — ALFUZOSIN HYDROCHLORIDE 10 MG/1
10 TABLET, EXTENDED RELEASE ORAL DAILY
Qty: 90 TABLET | Refills: 1 | Status: SHIPPED | OUTPATIENT
Start: 2025-02-24

## 2025-02-24 RX ORDER — PREDNISONE 10 MG/1
TABLET ORAL
Qty: 20 TABLET | Refills: 0 | Status: SHIPPED | OUTPATIENT
Start: 2025-02-24

## 2025-02-24 NOTE — PROGRESS NOTES
Hero Bo is a 59 year old male who presents for a complete physical exam.   HPI:       Wt Readings from Last 6 Encounters:   02/24/25 168 lb 9.6 oz (76.5 kg)   07/19/24 165 lb 3.2 oz (74.9 kg)   11/08/23 161 lb 3.2 oz (73.1 kg)   10/30/23 160 lb 3.2 oz (72.7 kg)   08/21/23 165 lb 6.4 oz (75 kg)   01/26/23 168 lb 12.8 oz (76.6 kg)     Body mass index is 28.94 kg/m².     Cholesterol, Total (mg/dL)   Date Value   07/19/2024 179   01/26/2023 149   01/24/2022 153     CHOLESTEROL (mg/dL)   Date Value   08/14/2013 206 (H)     HDL Cholesterol (mg/dL)   Date Value   07/19/2024 48   01/26/2023 42   01/24/2022 47     HDL CHOL (mg/dL)   Date Value   08/14/2013 38 (L)     LDL Cholesterol (mg/dL)   Date Value   07/19/2024 99   01/26/2023 72   01/24/2022 80     LDL CHOLESTROL (mg/dL)   Date Value   08/14/2013 113     AST (U/L)   Date Value   07/19/2024 25   01/26/2023 23   01/24/2022 23     ALT (U/L)   Date Value   07/19/2024 32   01/26/2023 43   01/24/2022 58      Current Outpatient Medications   Medication Sig Dispense Refill    atorvastatin 10 MG Oral Tab Take 1 tablet (10 mg total) by mouth nightly. 30 tablet 0    febuxostat 40 MG Oral Tab Take 1 tablet (40 mg total) by mouth daily. 30 tablet 0    amLODIPine Besy-Benazepril HCl 5-20 MG Oral Cap Take 1 capsule by mouth daily. DUE FOR AN APPOINTMENT 30 capsule 0    alfuzosin ER 10 MG Oral Tablet 24 Hr Take 1 tablet (10 mg total) by mouth daily. 90 tablet 1    Sildenafil Citrate 100 MG Oral Tab Take 1 tablet (100 mg total) by mouth daily as needed for Erectile Dysfunction. 30 tablet 5    Multiple Vitamins-Minerals (MULTIVITAMIN ADULT OR) Take by mouth daily.      Zinc Gluconate 30 MG Oral Tab Take 30 mg by mouth daily.      Magnesium Citrate 200 MG Oral Tab Take 200 mg by mouth 2 (two) times a day.      Vitamin D3, Cholecalciferol, 125 MCG (5000 UT) Oral Cap Take 1 capsule (5,000 Units total) by mouth daily.      Omega-3 1000 MG Oral Cap Take 1,000 mg by mouth daily.  (Patient not taking: Reported on 2025)        Past Medical History:    Abdominal pain    Arthritis    Essential hypertension, benign    Food intolerance    Gout    High cholesterol    Stress    Unspecified essential hypertension    Wears glasses      Past Surgical History:   Procedure Laterality Date    Colonoscopy      Other surgical history      ganglion cyst rxn      Family History   Problem Relation Age of Onset    Hypertension Father     Hypertension Mother     Stroke Maternal Grandfather     Other (Other) Maternal Grandfather         cva      Social History:  Social History     Socioeconomic History    Marital status:    Tobacco Use    Smoking status: Former     Current packs/day: 0.00     Types: Cigarettes     Quit date: 2001     Years since quittin.1    Smokeless tobacco: Never   Vaping Use    Vaping status: Never Used   Substance and Sexual Activity    Alcohol use: No     Alcohol/week: 0.0 standard drinks of alcohol    Drug use: No   Other Topics Concern    Caffeine Concern Yes     Comment: 2 cups daily    Exercise Yes     Comment: 2-3x/week     Social Drivers of Health     Food Insecurity: No Food Insecurity (2025)    NCSS - Food Insecurity     Worried About Running Out of Food in the Last Year: No     Ran Out of Food in the Last Year: No   Transportation Needs: No Transportation Needs (2025)    NCSS - Transportation     Lack of Transportation: No   Housing Stability: Not At Risk (2025)    NCSS - Housing/Utilities     Has Housing: Yes     Worried About Losing Housing: No     Unable to Get Utilities: No      .        REVIEW OF SYSTEMS:   GENERAL: feels well otherwise  SKIN: denies rash  HEENT: denies nasal congestion, sinus pain or ST  LUNGS: denies shortness of breath  CARDIOVASCULAR: denies chest pain on exertion  GI: denies abdominal pain  : denies dysuria  NEURO: denies headaches  MUSC\"  sore L posterior neck pain that travels to the LUE    symone in the 4,5 as  well as 2.3 fingers    no injury     has chiro appt in AM       advil helps   uses daily at night    PSYCHE: denies depression or anxiety  HEMATOLOGIC: denies hx of anemia  ENDOCRINE: denies thyroid history  ALL/ASTHMA: denies hx of allergy or asthma    EXAM:   /84 (BP Location: Right arm, Patient Position: Sitting, Cuff Size: adult)   Pulse 87   Temp 98.2 °F (36.8 °C) (Temporal)   Ht 5' 4\" (1.626 m)   Wt 168 lb 9.6 oz (76.5 kg)   SpO2 98%   BMI 28.94 kg/m²   Body mass index is 28.94 kg/m².   GENERAL: well developed, well nourished,in no apparent distress  SKIN: no rashes,  HEENT: atraumatic, normocephalic,ears and throat are clear  NECK: supple,no adenopathy   tender L trapezius    +spasm  LUNGS: clear to auscultation  CARDIO: RRR without murmur  GI: good BS's,no masses, HSM or tenderness  RECTAL: hold for now d/t having PSA drawn today    EXTREMITIES: no edema  NEURO: UE DTR 2/2  MS 5/5      ASSESSMENT AND PLAN:   TRAVISashok CHETAN Bo is a 59 year old male who presents for a complete physical exam.  Health maintenance, will check fasting Lipids, CMP, CBC and UA. PSA    discussed the neck/ LUE s/s   soundlike nerve impingment       pred taper ordered    rec hold off on chiro for now   call w update few days    BP ok   meds filled     discussed vaccines   wishes to wait for now  The patient indicates understanding of these issues and agrees to the plan.  The patient is asked to return for CPX in 1yr.

## 2025-02-28 ENCOUNTER — PATIENT MESSAGE (OUTPATIENT)
Dept: INTERNAL MEDICINE CLINIC | Facility: CLINIC | Age: 60
End: 2025-02-28

## 2025-02-28 DIAGNOSIS — M48.02 CERVICAL STENOSIS OF SPINAL CANAL: Primary | ICD-10-CM

## 2025-02-28 NOTE — TELEPHONE ENCOUNTER
TO: Please see [facility] for patient condition update. Any further recs for this patient? Please advise, TY    Per LOV:  'rec hold off on chiro for now call w update few days\"

## 2025-03-01 DIAGNOSIS — R20.2 NUMBNESS AND TINGLING IN LEFT ARM: Primary | ICD-10-CM

## 2025-03-01 DIAGNOSIS — R20.0 NUMBNESS AND TINGLING IN LEFT ARM: Primary | ICD-10-CM

## 2025-03-03 ENCOUNTER — TELEPHONE (OUTPATIENT)
Dept: INTERNAL MEDICINE CLINIC | Facility: CLINIC | Age: 60
End: 2025-03-03

## 2025-03-03 NOTE — TELEPHONE ENCOUNTER
MCM from patient forwarded to provider earlier this morning regarding same issue. Will close this encounter and chart on MCM once response received.     Hero Bo  P [facility] Clinical Staff (supporting Darlene Bryson MD)Yesterday (10:37 AM)     BRIJESH Bryson,     For CPT 76376 MRI, it will cost me $1,258 at [facility] or [facility]. Can I schedule one at [facility], where they charge around $400?      Regards.

## 2025-03-03 NOTE — TELEPHONE ENCOUNTER
Patient notified via [facility], number for [facility] provided.       Darlene Bryson MD to [facility] Clinical Staff       3/3/25 12:04 PM  I'm OK w it    Another option in [facility] in [place]

## 2025-03-03 NOTE — TELEPHONE ENCOUNTER
TO: Please see MCM. Ok for patient to have MRI done at [facility]? Please advise, TY

## 2025-03-03 NOTE — TELEPHONE ENCOUNTER
Pt is asking if the Order he has for the MRI can be changed to be done through [facility] due to the cost being less.

## 2025-03-04 NOTE — TELEPHONE ENCOUNTER
Pt called to follow up. Still wants to go with [facility]. Provided fax number of [phone]. Printed order and faxed to number. Confirmation received.

## 2025-03-11 NOTE — TELEPHONE ENCOUNTER
TO: Patient requesting you to review MRI done through [facility]. Found in Care Everywhere 3/6/2025. Please advise regarding follow up as well. TY

## 2025-03-12 ENCOUNTER — TELEPHONE (OUTPATIENT)
Dept: INTERNAL MEDICINE CLINIC | Facility: CLINIC | Age: 60
End: 2025-03-12

## 2025-03-12 ENCOUNTER — SPINE CENTER NAVIGATION (OUTPATIENT)
Age: 60
End: 2025-03-12

## 2025-03-12 NOTE — TELEPHONE ENCOUNTER
Order for spinal center placed. Attempted to call patient to inform regarding results and order placed. Left message to call back.

## 2025-03-12 NOTE — TELEPHONE ENCOUNTER
TO: Pended order for spinal center. Unsure of diagnosis association. Please review and sign if you agree, Darlene Mcallister MD to [facility] Clinical Staff       3/12/25 10:09 AM   His scan reveals a lot of narrowing , ayse the L side c/w his symptoms     rec see [facility]

## 2025-03-12 NOTE — TELEPHONE ENCOUNTER
Spoke with patient regarding results and recommendations per Dr. Bryson. Notified regarding placed order for spine center. Verbalized understanding.

## 2025-03-17 ENCOUNTER — OFFICE VISIT (OUTPATIENT)
Dept: PAIN CLINIC | Facility: CLINIC | Age: 60
End: 2025-03-17
Payer: COMMERCIAL

## 2025-03-17 ENCOUNTER — TELEPHONE (OUTPATIENT)
Dept: PAIN CLINIC | Facility: CLINIC | Age: 60
End: 2025-03-17

## 2025-03-17 VITALS
DIASTOLIC BLOOD PRESSURE: 86 MMHG | HEART RATE: 70 BPM | WEIGHT: 168 LBS | SYSTOLIC BLOOD PRESSURE: 138 MMHG | BODY MASS INDEX: 29 KG/M2 | OXYGEN SATURATION: 98 %

## 2025-03-17 DIAGNOSIS — M48.02 FORAMINAL STENOSIS OF CERVICAL REGION: Primary | ICD-10-CM

## 2025-03-17 DIAGNOSIS — M54.12 CERVICAL RADICULITIS: ICD-10-CM

## 2025-03-17 PROCEDURE — 99204 OFFICE O/P NEW MOD 45 MIN: CPT | Performed by: PHYSICIAN ASSISTANT

## 2025-03-17 NOTE — PROGRESS NOTES
Patient: Hero Bo  Medical Record Number: [id number]  Site: [facility]  Referring Physician:  Dr. Bryson  PCP: same    Dear Dr. Bryson:    Thank you very much for requesting this consultation. I had the opportunity to evaluate and initiate care for your patient today, as per your request.    HISTORY OF CHIEF COMPLAINT:      Hero Bo is a 59 year old male, who complains of neck and radiating L UE pain along triceps and extensor surface of foream to 2nd - 3rd digits of hand.    Pt is here today at the request of PCP with pain in above described distribution that began 3 weeks ago.  Initially, simply lived with it, though as pain progressed, he was evaluated by PCP, and was given course of tapered PO steroid, to good relief of pain.  Unfortunately, n/t persists, and with ROM and certain activities, pain is still present, and he was sent for MRI, which shows multilevel foraminal stenosis (see below).  Sent for consideration of injections.      VAS Pain Score:  0-8/10    Hand Dominance: right  Loss of dexterity: No  Dropping things: No    Aggravating Factors: Relieving Factors:   Worse in PM  Sitting in one position for prolonged time  Changing positions      Past Treatment Attempted/Patient’s Response:  As above     Past Medical History:    Abdominal pain    Arthritis    Essential hypertension, benign    Food intolerance    Gout    High cholesterol    Stress    Unspecified essential hypertension    Wears glasses      Past Surgical History:   Procedure Laterality Date    Colonoscopy      Other surgical history      ganglion cyst rxn      Family History   Problem Relation Age of Onset    Hypertension Father     Hypertension Mother     Stroke Maternal Grandfather     Other (Other) Maternal Grandfather         cva      Social History     Socioeconomic History    Marital status:    Tobacco Use    Smoking status: Former     Current packs/day: 0.00     Types: Cigarettes     Quit date:  2001     Years since quittin.1    Smokeless tobacco: Never   Vaping Use    Vaping status: Never Used   Substance and Sexual Activity    Alcohol use: No     Alcohol/week: 0.0 standard drinks of alcohol    Drug use: No   Other Topics Concern    Caffeine Concern Yes     Comment: 2 cups daily    Exercise Yes     Comment: 2-3x/week      Current Medications:  Current Outpatient Medications   Medication Sig Dispense Refill    predniSONE 10 MG Oral Tab 4 PO daily for 2 days, then 3 PO daily for 2 days, the 2 PO daily for 2 days , then 1 PO daily for 2 days 20 tablet 0    alfuzosin ER 10 MG Oral Tablet 24 Hr Take 1 tablet (10 mg total) by mouth daily. 90 tablet 1    amLODIPine Besy-Benazepril HCl 5-20 MG Oral Cap Take 1 capsule by mouth daily. DUE FOR AN APPOINTMENT 90 capsule 1    atorvastatin 10 MG Oral Tab Take 1 tablet (10 mg total) by mouth nightly. 90 tablet 1    febuxostat 40 MG Oral Tab Take 1 tablet (40 mg total) by mouth daily. 90 tablet 1    Sildenafil Citrate 100 MG Oral Tab Take 1 tablet (100 mg total) by mouth daily as needed for Erectile Dysfunction. 30 tablet 5    Multiple Vitamins-Minerals (MULTIVITAMIN ADULT OR) Take by mouth daily.      Zinc Gluconate 30 MG Oral Tab Take 30 mg by mouth daily.      Magnesium Citrate 200 MG Oral Tab Take 200 mg by mouth 2 (two) times a day.      Vitamin D3, Cholecalciferol, 125 MCG (5000 UT) Oral Cap Take 1 capsule (5,000 Units total) by mouth daily.      Omega-3 1000 MG Oral Cap Take 1,000 mg by mouth daily. (Patient not taking: Reported on 2025)          Functional Assessment: Patient reports that they are able to complete all of their ADL's such as eating, bathing, using the toilet, dressing and getting up from a bed or a chair independently.    Work History:  The patient currently works full time as .       REVIEW OF SYSTEMS:   10 point review of systems is otherwise negative,unless otherwise in HPI.      Radiology/Lab Test  Reviewed:  MRI C spine:    IMPRESSION:   1. Mild to moderate spinal canal stenosis with moderate left-sided neural foraminal narrowing at   C3-C4.   2. Moderate to severe left-sided neural foraminal narrowing at C5-C6.   3. Moderate to severe bilateral neural foraminal narrowing at C6-C7.   4. No discrete cord lesions.         CBC:    Lab Results   Component Value Date    WBC 7.2 02/24/2025    WBC 6.5 01/26/2023    WBC 6.6 01/24/2022   No results found for: \"HEMOGLOBIN\"  Lab Results   Component Value Date    .0 02/24/2025    .0 01/26/2023    .0 01/24/2022             PHYSICAL EXAMIMATION   PHYSICAL EXAMINATION: Hero Bo is a 59 year old male who is observed sitting comfortably on a chair in the exam room alert and oriented times three. He looks consistent with his stated age.    Ht Readings from Last 1 Encounters:   02/24/25 64\"     Wt Readings from Last 1 Encounters:   02/24/25 168 lb 9.6 oz (76.5 kg)     The patient is well developed, well nourished, normal body habitus, well muscled. He moves independently from sitting to standing with ease.       Coordination:  Well coordinated; able to engage in rapid alternating movements bilateral upper extremities    Tandem Walk: Able    ROM Cervical Spine:  See chart below:  Motion Right (+ or -) Left (+ or -)   Cervical flexion - -   Cervical extension - +   Cervical lateral bending - +   Cervical rotation - -     Integument:  Skin over area of cervical spine intact; no erythema, rashes, excoriations, lesions noted    Palpation:  See chart below:  Palpation of Right (+ or -) Left (+ or -)   Cervical Facets - -   Thoracic Facets - -   Paraspinal - -   Trapezius - -   Scapula - -   Occipital - -     Strength:  Strength of bilateral upper extremities grossly intact; 5/5 throughout    Sensation:  Normal sensation noted to light touch and pressure throughout bilateral upper extremities.    Tests:  Test Right (+ or -) Left (+ or -)   Spurling - +    Torres’s Test     Clonus       HEAD/NECK: Head is normocephalic, neck supple  EYES: EOMI, SAMARA  LYMPH EXAM: There is no lymph edema in either lower extremity.  VASCULAR EXAM: Radial pulses are normal bilaterally, with good distal perfusion. No clubbing or cyanosis.  HEART: normal, regular, S1 and S2  LUNGS: CTA  MUSCULOSKELETAL: Smooth, pain-free ROM to bilateral shoulders,elbows, wrists and digits.    Do you have any known blood/bleeding disorders?  No  Does patient currently take blood thinners?   None  Does patient currently take any antibiotics?   No    Patient is currently on pain medications:  No  Reason pain medications are prescribed: N/A  Pain medications are prescribed by: N/A  [place] Prescription Monitoring review: N/A  DIRE: N/A  Treatment decision: N/A    MEDICAL DECISION MAKING:   Impression: cervical foraminal stenosis, left cervical radiculitis    Neck and radicular L UE pain to 2nd and 3rd digits of hand in setting of MRI evidence of severe L C5/6 foraminal stenosis, C6/7 to lesser degree.  With PO steroid, pain has significant improved, though with ROM or maintaining one position for prolonged time, still with pain.  On exam, has pain with ROM C spine with + L spurling's.  No focal sensory/motor deficits.      We discussed options, and pt wishes to remain conservative.  Will have him try PT with focus on HEP.  If ineffective, contact clinic, and will be happy to place order for EVA.  If conservative management fails, would have him evaluated by surgery.      Plan: initiate PT, consider EVA if ineffective.       The patient indicates understanding of these issues and agrees to the plan.      Thank you very much.     Respectfully yours,    ALESIA Sorensen

## 2025-03-17 NOTE — PATIENT INSTRUCTIONS
Refill policies:    Allow 2-3 business days for refills; controlled substances may take longer.  Contact your pharmacy at least 5 days prior to running out of medication and have them send an electronic request or submit request through the “request refill” option in your [employer] account.  Refills are not addressed on weekends; covering physicians do not authorize routine medications on weekends.  No narcotics or controlled substances are refilled after noon on Fridays or by on call physicians.  By law, narcotics must be electronically prescribed.  A 30 day supply with no refills is the maximum allowed.  If your prescription is due for a refill, you may be due for a follow up appointment.  To best provide you care, patients receiving routine medications need to be seen at least once a year.  Patients receiving narcotic/controlled substance medications need to be seen at least once every 3 months.  In the event that your preferred pharmacy does not have the requested medication in stock (e.g. Backordered), it is your responsibility to find another pharmacy that has the requested medication available.  We will gladly send a new prescription to that pharmacy at your request.    Scheduling Tests:    If your physician has ordered radiology tests such as MRI or CT scans, please contact Central Scheduling at [phone] right away to schedule the test.  Once scheduled, the [facility] Centralized Referral Team will work with your insurance carrier to obtain pre-certification or prior authorization.  Depending on your insurance carrier, approval may take 3-10 days.  It is highly recommended patients assure they have received an authorization before having a test performed.  If test is done without insurance authorization, patient may be responsible for the entire amount billed.      Precertification and Prior Authorizations:  If your physician has recommended that you have a procedure or additional testing performed the [facility]  Centralized Referral Team will contact your insurance carrier to obtain pre-certification or prior authorization.    You are strongly encouraged to contact your insurance carrier to verify that your procedure/test has been approved and is a COVERED benefit.  Although the [facility] Centralized Referral Team does its due diligence, the insurance carrier gives the disclaimer that \"Although the procedure is authorized, this does not guarantee payment.\"    Ultimately the patient is responsible for payment.   Thank you for your understanding in this matter.  Paperwork Completion:  If you require FMLA or disability paperwork for your recovery, please make sure to either drop it off or have it faxed to our office at [phone]. Be sure the form has your name and date of birth on it.  The form will be faxed to our Forms Department and they will complete it for you.  There is a 25$ fee for all forms that need to be filled out.  Please be aware there is a 10-14 day turnaround time.  You will need to sign a release of information (TARYN) form if your paperwork does not come with one.  You may call the Forms Department with any questions at [phone].  Their fax number is [phone].

## 2025-03-17 NOTE — TELEPHONE ENCOUNTER
Uploaded DOS 3/6/25 MRI Spine Cervical. Verified upload via [employer]. Endorsed to Provider. Returned disc at checkout.

## 2025-03-17 NOTE — PROGRESS NOTES
Subjective:   Patient ID: Zulkifli B Anupam is a 59 year old male.    HPI    History/Other:   Review of Systems  Current Outpatient Medications   Medication Sig Dispense Refill   • predniSONE 10 MG Oral Tab 4 PO daily for 2 days, then 3 PO daily for 2 days, the 2 PO daily for 2 days , then 1 PO daily for 2 days 20 tablet 0   • alfuzosin ER 10 MG Oral Tablet 24 Hr Take 1 tablet (10 mg total) by mouth daily. 90 tablet 1   • amLODIPine Besy-Benazepril HCl 5-20 MG Oral Cap Take 1 capsule by mouth daily. DUE FOR AN APPOINTMENT 90 capsule 1   • atorvastatin 10 MG Oral Tab Take 1 tablet (10 mg total) by mouth nightly. 90 tablet 1   • febuxostat 40 MG Oral Tab Take 1 tablet (40 mg total) by mouth daily. 90 tablet 1   • Sildenafil Citrate 100 MG Oral Tab Take 1 tablet (100 mg total) by mouth daily as needed for Erectile Dysfunction. 30 tablet 5   • Multiple Vitamins-Minerals (MULTIVITAMIN ADULT OR) Take by mouth daily.     • Zinc Gluconate 30 MG Oral Tab Take 30 mg by mouth daily.     • Magnesium Citrate 200 MG Oral Tab Take 200 mg by mouth 2 (two) times a day.     • Vitamin D3, Cholecalciferol, 125 MCG (5000 UT) Oral Cap Take 1 capsule (5,000 Units total) by mouth daily.     • Omega-3 1000 MG Oral Cap Take 1,000 mg by mouth daily. (Patient not taking: Reported on 2/24/2025)       Allergies:Allergies[1]    Objective:   Physical Exam  Constitutional:          Assessment & Plan:   No diagnosis found.    No orders of the defined types were placed in this encounter.      Meds This Visit:  Requested Prescriptions      No prescriptions requested or ordered in this encounter       Imaging & Referrals:  None        Location of Pain: left side cervical radiculopathy    Date Pain Began: 3 weeks          Work Related:   No        Receiving Work Comp/Disability:   No    Numeric Rating Scale:  Pain at Present:  0                                                                                                            (No Pain) 0  to  10  (Worst Pain)                 Minimum Pain:   0  Maximum Pain  0    Distribution of Pain:    left    Quality of Pain:   numbness and tingling    Origin of Pain:    Other unknown    Aggravating Factors:    Other laying down    Past Treatments for Current Pain Condition:   Other none    Prior diagnostic testing for your pain:  MRI          [1] No Known Allergies

## 2025-03-19 ENCOUNTER — TELEPHONE (OUTPATIENT)
Dept: PHYSICAL THERAPY | Facility: HOSPITAL | Age: 60
End: 2025-03-19

## 2025-03-20 ENCOUNTER — OFFICE VISIT (OUTPATIENT)
Dept: PHYSICAL THERAPY | Age: 60
End: 2025-03-20
Attending: PHYSICIAN ASSISTANT
Payer: COMMERCIAL

## 2025-03-20 DIAGNOSIS — M48.02 FORAMINAL STENOSIS OF CERVICAL REGION: Primary | ICD-10-CM

## 2025-03-20 DIAGNOSIS — M54.12 CERVICAL RADICULITIS: ICD-10-CM

## 2025-03-20 PROCEDURE — 97110 THERAPEUTIC EXERCISES: CPT

## 2025-03-20 PROCEDURE — 97162 PT EVAL MOD COMPLEX 30 MIN: CPT

## 2025-03-20 NOTE — PROGRESS NOTES
SPINE EVALUATION:     Diagnosis:   Foraminal stenosis of cervical region (M48.02)  Cervical radiculitis (M54.12) Patient:  Hero Bo (59 year old, male  (\"Zu\")        Referring Provider: Elian Levine  Today's Date   3/20/2025    Precautions:  None   Date of Evaluation: 03/20/25  Next MD visit: No data recorded  Date of Surgery: No data recorded     PATIENT SUMMARY   Summary of chief complaints: N/T at L scapula, posterior UE and 2nd/3rd fingers palmar aspect; some pain at superior scapular border  History of current condition: woke up from hotel stay about 3 wks ago with L side neck/scapular pain, progressed to N/T; overall improved 60-70% with steroid pack; notices B finger tingling with walking 3-4 miles   Pain level: current 1 /10 (numbness/tingling rating), at best 1 /10, at worst 3 /10  Description of symptoms: N/T L scapula and arm; some pain at L scapula   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 Mild to moderate spinal canal stenosis with moderate left-sided neural foraminal narrowing at   C3-C4.   2. Moderate to severe left-sided neural foraminal narrowing at C5-C6.   3. Moderate to severe bilateral neural foraminal narrowing at C6-C7.   4. No discrete cord lesions.       ASSESSMENT  Hero presents to physical therapy evaluation with primary c/o N/T at L scapula, posterior UE and 2nd/3rd fingers palmar aspect; some pain at superior scapular border. The results of the objective tests and measures show impaired posture, decreased cervical ROM, decreased UE strength, decreased pt ed for HEP, +  neural tension. Functional deficits include but are not limited to lying on either side, L>R; sidebending to L; prolonged sitting; disturbed sleep. Signs and symptoms are consistent with diagnosis of Foraminal stenosis of cervical region (M48.02)  Cervical radiculitis (M54.12). Pt and PT discussed evaluation findings, pathology, POC and HEP.  Pt voiced understanding and performs HEP correctly without reported pain. Skilled Physical Therapy is medically necessary to address the above impairments and reach functional goals.    OBJECTIVE:    Musculoskeletal:  Observation/Posture: decreased cervical lordosis; increased thoracic kyphosis; scapular abduction; rounded shoulders (depressed R scapula (R hand dominant); slight R lateral cervical sidebend)   Accessory Motion:     Palpation: -- (increased tissue tighntess L>R levator scap, upper trap; increased L UE symptoms with PA pressure to lower Cspine)     Special tests:    + Spurlings (pain with combined extension/rotation w/o overpressure; seated manual distraction + for symptom relief; trial of supine distraction also improves; ULTT + left     ROM and Strength:  (* denotes performed with pain)     Cervical ROM     Flex 50     Ext 54 (feels pain through the range, but not specifically at end ROM)    R L     Side bend 30 27*     Rotation 50 45* w/tingling   ,   Shoulder   ROM MMT (-/5)    R L R L     Flex WNL WNL 5 5     Ext WNL WNL         Abd (C5) WNL WNL 5 5     IR Mod limited WNL 5 5     ER WNL WNL 5 5     Low Trap n/a 4 4-     Mid Trap n/a 4 4-     SA n/a         ,   Elbow   MMT (-/5)    R L     Flex (C6) 5 5     Ext (C7) 5 4     Pronation 5 5     Supination 5 5   ,   Wrist   ROM MMT (-/5)    R L R L     Flex (C7)     5 5     Ext (C6)     5 5       Flexibility:  UE Flexibility R L     Upper Trap mod restricted mod restricted     Levator Scap mod restricted mod restricted     Pec Major   mod restricted       Neurological:  Sensation: -- (increased sensitivity with  light touch to lateral upper arm left)     Deep Tendon Reflexes:  NT      UMN:      Torres's: negative        Peripheral Neurodynamic: WNL except     Balance and Functional Mobility:  Gait: pt ambulates on level ground with normal mechanics.         Today's Treatment and Response:   Pt education was provided on exam findings, treatment diagnosis, treatment plan, expectations, and prognosis.  Today's Treatment       3/20/2025   Spine Treatment   Therapeutic Exercise Seated chin tuck 5\"x5  Seated scap retraction 5\"x5  Corner pec stretch x30\"  L upper trap stretch (toward right) w/o overpressure x30\"     Neuro Re-Education Pt ed for seated work positioning for neutral posture - cues for arm position to decrease shoulder elevation and neutral Cspine   Therapeutic Exercise Minutes 15   Total Time Of Timed Procedures 15   Total Time Of Service-Based Procedures 0   Total Treatment Time 15   HEP Access Code: [email or web address]  URL: [email or web address]  Date: 03/20/2025  Prepared by: Kaycee Reyes    Exercises  - Seated Scapular Retraction  - 1 x daily - 7 x weekly - 1 sets - 10 reps - 5 hold  - Seated Cervical Retraction  - 1 x daily - 7 x weekly - 1 sets - 10 reps - 5 hold  - Corner Pec Major Stretch  - 1 x daily - 7 x weekly - 3-4 reps - 30 hold  - Seated Cervical Sidebending Stretch  - 1 x daily - 7 x weekly - 3-4 reps - 30 hold        Patient was instructed in and issued a HEP for: Access Code: [email or web address]  URL: [email or web address]  Date: 03/20/2025  Prepared by: Kaycee Reyes    Exercises  - Seated Scapular Retraction  - 1 x daily - 7 x weekly - 1 sets - 10 reps - 5 hold  - Seated Cervical Retraction  - 1 x daily - 7 x weekly - 1 sets - 10 reps - 5 hold  - Corner Pec Major Stretch  - 1 x daily - 7 x weekly - 3-4 reps - 30 hold  - Seated Cervical Sidebending Stretch  - 1 x daily - 7 x weekly - 3-4 reps - 30 hold    Charges:  PT EVAL: Moderate Complexity,    In agreement with evaluation  findings and clinical rationale, this evaluation involved MODERATE COMPLEXITY decision making due to 1-2 personal factors/comorbidities, 3 or more body structures involved/activity limitations, and evolving symptoms as documented in the evaluation.                                                                         PLAN OF CARE:    Goals: (to be met in 12 visits)     Pt to be independent in HEP addressing posture, ROM and strength ex's to aid in reducing symptoms and improving activity tolerance.  Pt to show decreased NDI score by 10% for indication of improved functional status.  Pt to report improved sleep by 50% or greater for improved quality of life.  Pt to display improved left cervical rotation/sidebend w/o increased symptoms for improved ability in looking at multiple computer screens and driving  Pt to display improved left tricep and middle trap strength by 1/2 grade for normalized function and symmetry.  Pt to report reduced radicular symptoms by 50% in intensity and frequency for improved quality of life.          Frequency / Duration: Patient will be seen 2x/wk or a total of 12  visits over a 90 day period. Treatment will include: Manual Therapy; Mechanical Traction; Neuromuscular Re-education; Self-Care Home Management; Home Exercise Program instruction; Therapeutic Exercise    Education or treatment limitation: None   Rehab Potential: good     Neck Disability Index Score  Score: (Patient-Rptd) 22 % (3/20/2025 12:36 PM)      Patient/Family/Caregiver was advised of these findings, precautions, and treatment options and has agreed to actively participate in planning and for this course of care.    Thank you for your referral. Please co-sign or sign and return this letter via fax as soon as possible to [phone]. If you have any questions, please contact me at Dept: [phone]

## 2025-03-24 ENCOUNTER — OFFICE VISIT (OUTPATIENT)
Dept: PHYSICAL THERAPY | Age: 60
End: 2025-03-24
Attending: PHYSICIAN ASSISTANT
Payer: COMMERCIAL

## 2025-03-24 PROCEDURE — 97110 THERAPEUTIC EXERCISES: CPT

## 2025-03-24 PROCEDURE — 97012 MECHANICAL TRACTION THERAPY: CPT

## 2025-03-24 PROCEDURE — 97140 MANUAL THERAPY 1/> REGIONS: CPT

## 2025-03-24 NOTE — PROGRESS NOTES
Patient: Hero Bo (59 year old, male) Referring Provider:  Insurance:   Diagnosis: Foraminal stenosis of cervical region; cervical radiculitis Elian HURST INS   Date of Surgery: No data recorded Next MD visit:  N/A   Precautions:  None pending Referral Information:    Date of Evaluation: Req: 0, Auth: 0, Exp:     03/20/25 POC Auth Visits:  12       Today's Date   3/24/2025    Subjective  pt states he slept through the night for the first time since onset of symptoms; states 3rd finger sensation is better, 2nd finger still very dull; no posterior arm numbness today.  pt has been doing HEP 2x/day with good tolerance.       Pain: 0/10          Assessment  pt with good tolerance to initial session including manual, ther ex and trial of mechanical traction.  pt showing initial benefit since eval with compliance in HEP.  significant tissue tightness L>R Cspine, UT, levator scap and pec musculature.    Goals (to be met in 12 visits)   Pt to be independent in HEP addressing posture, ROM and strength ex's to aid in reducing symptoms and improving activity tolerance.  Pt to show decreased NDI score by 10% for indication of improved functional status.  Pt to report improved sleep by 50% or greater for improved quality of life.  Pt to display improved left cervical rotation/sidebend w/o increased symptoms for improved ability in looking at multiple computer screens and driving  Pt to display improved left tricep and middle trap strength by 1/2 grade for normalized function and symmetry.  Pt to report reduced radicular symptoms by 50% in intensity and frequency for improved quality of life.              Plan  continue skilled PT 2x/week for manual intervention, pt ed and therapeutic ex's to promote improved ROM/flexibility,    Treatment Last 4 Visits       3/24/2025   PT Treatment   Treatment Day 2          3/20/2025 3/24/2025   Spine Treatment   Treatment Day  2   Therapeutic Exercise Seated chin tuck  5\"x5  Seated scap retraction 5\"x5  Corner pec stretch x30\"  L upper trap stretch (toward right) w/o overpressure x30\"   Seated chin tuck 5\"x10  Seated levator scap stretch x30\"     Neuro Re-Education Pt ed for seated work positioning for neutral posture - cues for arm position to decrease shoulder elevation and neutral Cspine    Manual Therapy  Supine manual cervical distraction 2x30\"  Passive UT stretch (to R only) 2x30\"  Passive levator scap stretch 2x30\" bilateral  Supine neuro dynamic stretching (in cervical R lat flexion with repeated wrist extension stretch)  Passive supine pec stretch 2x30\"    Seated STM to L>R UT, levator scap, Cervical PS x8'   Modalities  Mechanical cervical traction x8' at 12#, monitoring pt throughout duration   Therapeutic Exercise Minutes 15 15   Manual Therapy Minutes  20   Mechanical Traction Minutes  8   Total Time Of Timed Procedures 15 35   Total Time Of Service-Based Procedures 0 8   Total Treatment Time 15 43   HEP Access Code: [email or web address]  URL: [email or web address]  Date: 03/20/2025  Prepared by: Kaycee Reyes    Exercises  - Seated Scapular Retraction  - 1 x daily - 7 x weekly - 1 sets - 10 reps - 5 hold  - Seated Cervical Retraction  - 1 x daily - 7 x weekly - 1 sets - 10 reps - 5 hold  - Corner Pec Major Stretch  - 1 x daily - 7 x weekly - 3-4 reps - 30 hold  - Seated Cervical Sidebending Stretch  - 1 x daily - 7 x weekly - 3-4 reps - 30 hold Pt instructed in self levator scap stretch B, also in median nerve stretch supine or sitting        HEP  Pt instructed in self levator scap stretch B, also in median nerve stretch supine or sitting

## 2025-03-27 ENCOUNTER — OFFICE VISIT (OUTPATIENT)
Dept: PHYSICAL THERAPY | Age: 60
End: 2025-03-27
Attending: PHYSICIAN ASSISTANT
Payer: COMMERCIAL

## 2025-03-27 PROCEDURE — 97140 MANUAL THERAPY 1/> REGIONS: CPT

## 2025-03-27 PROCEDURE — 97110 THERAPEUTIC EXERCISES: CPT

## 2025-03-27 NOTE — PROGRESS NOTES
Patient: Hero Bo (59 year old, male) Referring Provider:  Insurance:   Diagnosis: Foraminal stenosis of cervical region; cervical radiculitis Elian HURST INS   Date of Surgery: No data recorded Next MD visit:  N/A   Precautions:  None pending Referral Information:    Date of Evaluation: Req: 0, Auth: 0, Exp:     03/20/25 POC Auth Visits:  12       Today's Date   3/27/2025    Subjective  pt states tingling in arm increased some after traction; back to baseline now.  No pain today.       Pain: 0/10     Objective     Prone mid and lower trap MMT = 4-/5 with noted increased UT substitution.        Assessment  pt with moderate redness in graston treatment, but well tolerated.  displays mod UT substituion in mid/lower trap strengthening; increased finger symptoms with wall/anti gravity lower trap, whic was dc'd. traction performed today at less duration due to some increased tingling after last time.    Goals (to be met in 12 visits)   Pt to be independent in HEP addressing posture, ROM and strength ex's to aid in reducing symptoms and improving activity tolerance.  Pt to show decreased NDI score by 10% for indication of improved functional status.  Pt to report improved sleep by 50% or greater for improved quality of life.  Pt to display improved left cervical rotation/sidebend w/o increased symptoms for improved ability in looking at multiple computer screens and driving  Pt to display improved left tricep and middle trap strength by 1/2 grade for normalized function and symmetry.  Pt to report reduced radicular symptoms by 50% in intensity and frequency for improved quality of life.                  Plan  reassess next visit and continue per POC as indicated    Treatment Last 4 Visits  Treatment Day: 3       3/20/2025 3/24/2025 3/27/2025   Spine Treatment   Therapeutic Exercise Seated chin tuck 5\"x5  Seated scap retraction 5\"x5  Corner pec stretch x30\"  L upper trap stretch (toward right) w/o  overpressure x30\"   Seated chin tuck 5\"x10  Seated levator scap stretch x30\"   Prone horizontal abduction w/2# x20   Prone low trap 0# x10 w/manual/verbal cues - pt with increased UT substitution    Wall Lower trap ex - trial, but pt with increased finger   Tingling    Pt demo for horiz abduction w/green TB for mid trap - monitor for symptoms; pt has TB at home    Doorway pec stretch 2x30\"     Neuro Re-Education Pt ed for seated work positioning for neutral posture - cues for arm position to decrease shoulder elevation and neutral Cspine     Manual Therapy  Supine manual cervical distraction 2x30\"  Passive UT stretch (to R only) 2x30\"  Passive levator scap stretch 2x30\" bilateral  Supine neuro dynamic stretching (in cervical R lat flexion with repeated wrist extension stretch)  Passive supine pec stretch 2x30\"    Seated STM to L>R UT, levator scap, Cervical PS x8' Graston technic to left UT, levator x7' for tissue extensibility     Passive UT stretch (to R only) 2x30\"   Passive levator scap stretch 2x30\" bilateral          Modalities  Mechanical cervical traction x8' at 12#, monitoring pt throughout duration Mechanical Cervical traction 12# x5' today   Therapeutic Exercise Minutes 15 15    Manual Therapy Minutes  20    Mechanical Traction Minutes  8    Total Time Of Timed Procedures 15 35 0   Total Time Of Service-Based Procedures 0 8 0   Total Treatment Time 15 43 0   HEP Access Code: [email or web address]  URL: [email or web address]  Date: 03/20/2025  Prepared by: Kaycee Reyes    Exercises  - Seated Scapular Retraction  - 1 x daily - 7 x weekly - 1 sets - 10 reps - 5 hold  - Seated Cervical Retraction  - 1 x daily - 7 x weekly - 1 sets - 10 reps - 5 hold  - Corner Pec Major Stretch  - 1 x daily - 7 x weekly - 3-4 reps - 30 hold  - Seated Cervical Sidebending Stretch  - 1 x daily - 7 x weekly - 3-4 reps - 30 hold Pt instructed in self levator scap stretch B, also in median nerve stretch supine or sitting HEP  updated to include neural stretch and levator scap stretch; verbal addition of prone mid/low trap strength and TB horizontal abduction        HEP  HEP updated to include neural stretch and levator scap stretch; verbal addition of prone mid/low trap strength and TB horizontal abduction    Charges   TherEx x2  Manual therapy x1

## 2025-03-31 ENCOUNTER — APPOINTMENT (OUTPATIENT)
Dept: PHYSICAL THERAPY | Age: 60
End: 2025-03-31
Attending: PHYSICIAN ASSISTANT
Payer: COMMERCIAL

## 2025-04-04 ENCOUNTER — OFFICE VISIT (OUTPATIENT)
Dept: PHYSICAL THERAPY | Age: 60
End: 2025-04-04
Attending: PHYSICIAN ASSISTANT
Payer: COMMERCIAL

## 2025-04-04 PROCEDURE — 97012 MECHANICAL TRACTION THERAPY: CPT

## 2025-04-04 PROCEDURE — 97140 MANUAL THERAPY 1/> REGIONS: CPT

## 2025-04-04 PROCEDURE — 97110 THERAPEUTIC EXERCISES: CPT

## 2025-04-04 NOTE — PROGRESS NOTES
Patient: Hero Bo (59 year old, male) Referring Provider:  Insurance:   Diagnosis: Foraminal stenosis of cervical region; cervical radiculitis Elian THOMPSON   Date of Surgery: No data recorded Next MD visit:  N/A   Precautions:  None pending Referral Information:    Date of Evaluation: Req: 0, Auth: 0, Exp:     03/20/25 POC Auth Visits:  12       Today's Date   4/4/2025    Subjective  Pt reports he feels the ex's are helping; he feels less intensity of finger numbness (constant, but decreased); does get some tingling when he tilts his head toward left.  reports good tolerance to traction last visit w/o increased pain.       Pain: 1/10 (slight numbness)               Assessment  Pt is showing good reponse to therapy with postural ex's, manual technics and cervical traction.  Pt is considering returning to MD; this therapist recommending continued treatment x4-5 additional visits to determine full benefit of PT, but pt encouraged to f/u with MD when he chooses.    Goals (to be met in 12 visits)   Pt to be independent in HEP addressing posture, ROM and strength ex's to aid in reducing symptoms and improving activity tolerance.  Pt to show decreased NDI score by 10% for indication of improved functional status.  Pt to report improved sleep by 50% or greater for improved quality of life.  Pt to display improved left cervical rotation/sidebend w/o increased symptoms for improved ability in looking at multiple computer screens and driving  Pt to display improved left tricep and middle trap strength by 1/2 grade for normalized function and symmetry.  Pt to report reduced radicular symptoms by 50% in intensity and frequency for improved quality of life.                      Plan  continue 1-2x/week as able per pt schedule and f/u with MD per pt's discretion/choice.    Treatment Last 4 Visits  Treatment Day: 4       3/20/2025 3/24/2025 3/27/2025 4/4/2025   Spine Treatment   Therapeutic Exercise Seated  chin tuck 5\"x5  Seated scap retraction 5\"x5  Corner pec stretch x30\"  L upper trap stretch (toward right) w/o overpressure x30\"   Seated chin tuck 5\"x10  Seated levator scap stretch x30\"   Prone horizontal abduction w/2# x20   Prone low trap 0# x10 w/manual/verbal cues - pt with increased UT substitution    Wall Lower trap ex - trial, but pt with increased finger   Tingling    Pt demo for horiz abduction w/green TB for mid trap - monitor for symptoms; pt has TB at home    Doorway pec stretch 2x30\"      Neuro Re-Education Pt ed for seated work positioning for neutral posture - cues for arm position to decrease shoulder elevation and neutral Cspine      Manual Therapy  Supine manual cervical distraction 2x30\"  Passive UT stretch (to R only) 2x30\"  Passive levator scap stretch 2x30\" bilateral  Supine neuro dynamic stretching (in cervical R lat flexion with repeated wrist extension stretch)  Passive supine pec stretch 2x30\"    Seated STM to L>R UT, levator scap, Cervical PS x8' Graston technic to left UT, levator x7' for tissue extensibility     Passive UT stretch (to R only) 2x30\"   Passive levator scap stretch 2x30\" bilateral        Supine manual distraction 3x20\"    Gentle PA mobs C2-7  Lateral sideglide mobs R to L throughout Cspine     STM L>R cervical PS, UT, levator scap    Seated Graston technic to L UT/levator scap x5'       Modalities  Mechanical cervical traction x8' at 12#, monitoring pt throughout duration Mechanical Cervical traction 12# x5' today Mechanical cervical traction x8' at 14# with therapist monitoring for tolerance and sustained traction   Therapeutic Exercise Minutes 15 15 30 15   Manual Therapy Minutes  20 10 20   Mechanical Traction Minutes  8  8   Total Time Of Timed Procedures 15 35 40 35   Total Time Of Service-Based Procedures 0 8 0 8   Total Treatment Time 15 43 40 43   HEP Access Code: [email or web address]  URL: [email or web address]  Date: 03/20/2025  Prepared by: Kaycee  Reyes    Exercises  - Seated Scapular Retraction  - 1 x daily - 7 x weekly - 1 sets - 10 reps - 5 hold  - Seated Cervical Retraction  - 1 x daily - 7 x weekly - 1 sets - 10 reps - 5 hold  - Corner Pec Major Stretch  - 1 x daily - 7 x weekly - 3-4 reps - 30 hold  - Seated Cervical Sidebending Stretch  - 1 x daily - 7 x weekly - 3-4 reps - 30 hold Pt instructed in self levator scap stretch B, also in median nerve stretch supine or sitting HEP updated to include neural stretch and levator scap stretch; verbal addition of prone mid/low trap strength and TB horizontal abduction Reviewed HEP with pt; pt states he did not try prone horizontal abd for mid trap strength, but will initiate.      Plan to progress with scapular strength next visit as tolerated.         HEP  Reviewed HEP with pt; pt states he did not try prone horizontal abd for mid trap strength, but will initiate.      Plan to progress with scapular strength next visit as tolerated.     Charges  TherEx x1  Manual x1  Mech Traction x1

## 2025-04-11 ENCOUNTER — OFFICE VISIT (OUTPATIENT)
Dept: PHYSICAL THERAPY | Age: 60
End: 2025-04-11
Attending: FAMILY MEDICINE
Payer: COMMERCIAL

## 2025-04-11 PROCEDURE — 97110 THERAPEUTIC EXERCISES: CPT

## 2025-04-11 PROCEDURE — 97140 MANUAL THERAPY 1/> REGIONS: CPT

## 2025-04-11 NOTE — PROGRESS NOTES
Patient: Hero Bo (59 year old, male) Referring Provider:  Insurance:   Diagnosis: Foraminal stenosis of cervical region; cervical radiculitis No ref. provider found  [employer]   Date of Surgery: No data recorded Next MD visit:  N/A   Precautions:  None pending Referral Information:    Date of Evaluation: Req: 0, Auth: 0, Exp:     03/20/25 POC Auth Visits:  12       Today's Date   4/11/2025    Subjective  Pt reports 2-3/10 neck pain during the day, mostly tightness.  reports the 3rd finger sensation is almost normal, and decreased tinglingin second finger.  reports feeling 'tired' after traction, but no increased soreness. pt performing HEP.       Pain: pain not reported (2/10 tightness)     Objective  MId trap and lower trap strength = 4/5 bilateral; rhomboid and latissimus 5/5            Assessment  pt displays decreased strength mid/lower trap bilateral, with continued forward shoulder posture and decreased length cervical/pectoral musculature.  Pt with + ULTT on left and increased symptoms with stretching, but tolerates well.  Pt independent in upgraded HEP today.    Goals (to be met in 12 visits)   Pt to be independent in HEP addressing posture, ROM and strength ex's to aid in reducing symptoms and improving activity tolerance.  Pt to show decreased NDI score by 10% for indication of improved functional status.  Pt to report improved sleep by 50% or greater for improved quality of life.  Pt to display improved left cervical rotation/sidebend w/o increased symptoms for improved ability in looking at multiple computer screens and driving  Pt to display improved left tricep and middle trap strength by 1/2 grade for normalized function and symmetry.  Pt to report reduced radicular symptoms by 50% in intensity and frequency for improved quality of life.                          Plan  continue 1x/wee x2-3 weeks with reassessment    Treatment Last 4 Visits  Treatment Day: 5       3/24/2025 3/27/2025 4/4/2025  4/11/2025   Spine Treatment   Therapeutic Exercise Seated chin tuck 5\"x10  Seated levator scap stretch x30\"   Prone horizontal abduction w/2# x20   Prone low trap 0# x10 w/manual/verbal cues - pt with increased UT substitution    Wall Lower trap ex - trial, but pt with increased finger   Tingling    Pt demo for horiz abduction w/green TB for mid trap - monitor for symptoms; pt has TB at home    Doorway pec stretch 2x30\"    Prone horizontal abduction 2x10 left  Prone lower trap 2x10 left    Wall Lower trap exercise x10 - pt with increased forearm/finger symptoms with overhead shldr movement;  Encouraged prone strengthening if better tolerated       Manual Therapy Supine manual cervical distraction 2x30\"  Passive UT stretch (to R only) 2x30\"  Passive levator scap stretch 2x30\" bilateral  Supine neuro dynamic stretching (in cervical R lat flexion with repeated wrist extension stretch)  Passive supine pec stretch 2x30\"    Seated STM to L>R UT, levator scap, Cervical PS x8' Graston technic to left UT, levator x7' for tissue extensibility     Passive UT stretch (to R only) 2x30\"   Passive levator scap stretch 2x30\" bilateral        Supine manual distraction 3x20\"    Gentle PA mobs C2-7  Lateral sideglide mobs R to L throughout Cspine     STM L>R cervical PS, UT, levator scap    Seated Graston technic to L UT/levator scap x5'     Passive median nerve stretch supine (+ UE and finger symptoms with ULTT) intermittent x2min    Supine passive UT/levator stretch 2x30\" ea    Seated Graston technic to left UT/levator scap/cervical PS x7'   Modalities Mechanical cervical traction x8' at 12#, monitoring pt throughout duration Mechanical Cervical traction 12# x5' today Mechanical cervical traction x8' at 14# with therapist monitoring for tolerance and sustained traction    Therapeutic Exercise Minutes 15 30 15 35   Manual Therapy Minutes 20 10 20 10   Mechanical Traction Minutes 8  8    Total Time Of Timed Procedures 35 40 35 45    Total Time Of Service-Based Procedures 8 0 8 0   Total Treatment Time 43 40 43 45   HEP Pt instructed in self levator scap stretch B, also in median nerve stretch supine or sitting HEP updated to include neural stretch and levator scap stretch; verbal addition of prone mid/low trap strength and TB horizontal abduction Reviewed HEP with pt; pt states he did not try prone horizontal abd for mid trap strength, but will initiate.      Plan to progress with scapular strength next visit as tolerated.  Access Code: [email or web address]  URL: [email or web address]  Date: 04/11/2025  Prepared by: Kaycee Reyes    Exercises  - Seated Scapular Retraction  - 1 x daily - 7 x weekly - 1 sets - 10 reps - 5 hold  - Seated Cervical Retraction  - 1 x daily - 7 x weekly - 1 sets - 10 reps - 5 hold  - Corner Pec Major Stretch  - 1 x daily - 7 x weekly - 3-4 reps - 30 hold  - Seated Cervical Sidebending Stretch  - 1 x daily - 7 x weekly - 3-4 reps - 30 hold  - Seated Levator Scapulae Stretch  - 1 x daily - 7 x weekly - 3 reps - 30 hold  - Median Nerve Flossing - Tray  - 1 x daily - 7 x weekly - 2 sets - 10 reps  - Prone Shoulder Horizontal Abduction  - 1 x daily - 7 x weekly - 3 sets - 10 reps  - Prone Single Arm Shoulder Y  - 1 x daily - 7 x weekly - 3 sets - 10 reps        HEP  Access Code: [email or web address]  URL: [email or web address]  Date: 04/11/2025  Prepared by: Kaycee Reyes    Exercises  - Seated Scapular Retraction  - 1 x daily - 7 x weekly - 1 sets - 10 reps - 5 hold  - Seated Cervical Retraction  - 1 x daily - 7 x weekly - 1 sets - 10 reps - 5 hold  - Corner Pec Major Stretch  - 1 x daily - 7 x weekly - 3-4 reps - 30 hold  - Seated Cervical Sidebending Stretch  - 1 x daily - 7 x weekly - 3-4 reps - 30 hold  - Seated Levator Scapulae Stretch  - 1 x daily - 7 x weekly - 3 reps - 30 hold  - Median Nerve Flossing - Tray  - 1 x daily - 7 x weekly - 2 sets - 10 reps  - Prone Shoulder Horizontal Abduction  - 1 x  daily - 7 x weekly - 3 sets - 10 reps  - Prone Single Arm Shoulder Y  - 1 x daily - 7 x weekly - 3 sets - 10 reps    Charges   TherEx x2  Manual x1

## 2025-04-18 ENCOUNTER — OFFICE VISIT (OUTPATIENT)
Dept: PHYSICAL THERAPY | Age: 60
End: 2025-04-18
Attending: FAMILY MEDICINE
Payer: COMMERCIAL

## 2025-04-18 PROCEDURE — 97140 MANUAL THERAPY 1/> REGIONS: CPT

## 2025-04-18 PROCEDURE — 97110 THERAPEUTIC EXERCISES: CPT

## 2025-04-18 NOTE — PROGRESS NOTES
Patient: Hero Bo (59 year old, male) Referring Provider:  Insurance:   Diagnosis: Foraminal stenosis of cervical region; cervical radiculitis No ref. provider found  [employer]   Date of Surgery: No data recorded Next MD visit:  N/A   Precautions:  None pending Referral Information:    Date of Evaluation: Req: 0, Auth: 0, Exp:     03/20/25 POC Auth Visits:  12       Today's Date   4/18/2025     Progress Summary  Pt has attended 6 visits in Physical Therapy.     Subjective  Pt reports increased pain this week due to work stress (3/10), but states the ex's do help.  reports overall decreased 3rd finger tingling, and less index tingling.  Pt states he would like to f/u with MD and determine next course for him.       Pain: 1/10     Objective          Cervical       3/20/2025 4/18/2025   Cervical ROM/MMT   Cervical Flex 50 40- feels pull on left side   Cervical Extension 54 (feels pain through the range, but not specifically at end ROM) 52   Cervical Rt Side Bending 30 30   Cervical Lt Side Bending 27* 30   Cervical Rt Rotation 50 55   Cervical Lt Rotation 45* w/tingling 55          Assessment  Pt has made gains in PT with independence in HEP, improvement in cervical L rotation,some decreased radicular symptoms and decreased tissue restrictions.  Pt has increased pain with overhead shoulder strength (prone and wall lower trap ex).  pt is appropriate to f/u with MD at this time for further consult and therapist will f/u with pt to determine future need for PT>    Goals (to be met in 12 visits)   Pt to be independent in HEP addressing posture, ROM and strength ex's to aid in reducing symptoms and improving activity tolerance.MET  Pt to show decreased NDI score by 10% for indication of improved functional status. NOT COMPLETED  Pt to report improved sleep by 50% or greater for improved quality of life. PARTIALLY MET  Pt to display improved left cervical rotation/sidebend w/o increased symptoms for improved  ability in looking at multiple computer screens and driving MET  Pt to display improved left tricep and middle trap strength by 1/2 grade for normalized function and symmetry. MET  Pt to report reduced radicular symptoms by 50% in intensity and frequency for improved quality of life. PARTIAL MET               Treatment Last 4 Visits  Treatment Day: 6       3/27/2025 4/4/2025 4/11/2025 4/18/2025   Spine Treatment   Therapeutic Exercise Prone horizontal abduction w/2# x20   Prone low trap 0# x10 w/manual/verbal cues - pt with increased UT substitution    Wall Lower trap ex - trial, but pt with increased finger   Tingling    Pt demo for horiz abduction w/green TB for mid trap - monitor for symptoms; pt has TB at home    Doorway pec stretch 2x30\"    Prone horizontal abduction 2x10 left  Prone lower trap 2x10 left    Wall Lower trap exercise x10 - pt with increased forearm/finger symptoms with overhead shldr movement;  Encouraged prone strengthening if better tolerated     Prone horiz abd x10  Prone lower trap x10- increaed thumb/index tingling and L side neck pain  Prone GH extension w/scap retraction x10   Manual Therapy Graston technic to left UT, levator x7' for tissue extensibility     Passive UT stretch (to R only) 2x30\"   Passive levator scap stretch 2x30\" bilateral        Supine manual distraction 3x20\"    Gentle PA mobs C2-7  Lateral sideglide mobs R to L throughout Cspine     STM L>R cervical PS, UT, levator scap    Seated Graston technic to L UT/levator scap x5'     Passive median nerve stretch supine (+ UE and finger symptoms with ULTT) intermittent x2min    Supine passive UT/levator stretch 2x30\" ea    Seated Graston technic to left UT/levator scap/cervical PS x7' STM/Graston technic x12' to left cervical musculature for pain relief and tissue extensibility   Modalities Mechanical Cervical traction 12# x5' today Mechanical cervical traction x8' at 14# with therapist monitoring for tolerance and sustained  traction     Therapeutic Exercise Minutes 30 15 35 15   Manual Therapy Minutes 10 20 10 15   Mechanical Traction Minutes  8     Total Time Of Timed Procedures 40 35 45 30   Total Time Of Service-Based Procedures 0 8 0 0   Total Treatment Time 40 43 45 30   HEP HEP updated to include neural stretch and levator scap stretch; verbal addition of prone mid/low trap strength and TB horizontal abduction Reviewed HEP with pt; pt states he did not try prone horizontal abd for mid trap strength, but will initiate.      Plan to progress with scapular strength next visit as tolerated.  Access Code: [email or web address]  URL: [email or web address]  Date: 04/11/2025  Prepared by: Kaycee Reyes    Exercises  - Seated Scapular Retraction  - 1 x daily - 7 x weekly - 1 sets - 10 reps - 5 hold  - Seated Cervical Retraction  - 1 x daily - 7 x weekly - 1 sets - 10 reps - 5 hold  - Corner Pec Major Stretch  - 1 x daily - 7 x weekly - 3-4 reps - 30 hold  - Seated Cervical Sidebending Stretch  - 1 x daily - 7 x weekly - 3-4 reps - 30 hold  - Seated Levator Scapulae Stretch  - 1 x daily - 7 x weekly - 3 reps - 30 hold  - Median Nerve Flossing - Tray  - 1 x daily - 7 x weekly - 2 sets - 10 reps  - Prone Shoulder Horizontal Abduction  - 1 x daily - 7 x weekly - 3 sets - 10 reps  - Prone Single Arm Shoulder Y  - 1 x daily - 7 x weekly - 3 sets - 10 reps         HEP  Access Code: [email or web address]  URL: [email or web address]  Date: 04/11/2025  Prepared by: Kaycee Reyes    Exercises  - Seated Scapular Retraction  - 1 x daily - 7 x weekly - 1 sets - 10 reps - 5 hold  - Seated Cervical Retraction  - 1 x daily - 7 x weekly - 1 sets - 10 reps - 5 hold  - Corner Pec Major Stretch  - 1 x daily - 7 x weekly - 3-4 reps - 30 hold  - Seated Cervical Sidebending Stretch  - 1 x daily - 7 x weekly - 3-4 reps - 30 hold  - Seated Levator Scapulae Stretch  - 1 x daily - 7 x weekly - 3 reps - 30 hold  - Median Nerve Flossing - Tray  - 1 x daily - 7  x weekly - 2 sets - 10 reps  - Prone Shoulder Horizontal Abduction  - 1 x daily - 7 x weekly - 3 sets - 10 reps  - Prone Single Arm Shoulder Y  - 1 x daily - 7 x weekly - 3 sets - 10 reps    Charges      TherEx x1  Manual x1        Plan: Pt to f/u with MD at this time and determine future recommendation.    Patient/Family/Caregiver was advised of these findings, precautions, and treatment options and has agreed to actively participate in planning and for this course of care.    Thank you for your referral. If you have any questions, please contact me at Dept: [phone].    Sincerely,  Electronically signed by therapist: Kaycee Reyes PT     Physician's certification required:  Yes  Please co-sign or sign and return this letter via fax as soon as possible to [phone].   I certify the need for these services furnished under this plan of treatment and while under my care.    X___________________________________________________ Date____________________    Certification From: 4/18/2025  To:7/17/2025

## 2025-04-25 ENCOUNTER — APPOINTMENT (OUTPATIENT)
Dept: PHYSICAL THERAPY | Age: 60
End: 2025-04-25
Attending: FAMILY MEDICINE
Payer: COMMERCIAL

## 2025-05-12 ENCOUNTER — OFFICE VISIT (OUTPATIENT)
Dept: PAIN CLINIC | Facility: CLINIC | Age: 60
End: 2025-05-12
Payer: COMMERCIAL

## 2025-05-12 VITALS
DIASTOLIC BLOOD PRESSURE: 76 MMHG | WEIGHT: 168 LBS | HEART RATE: 83 BPM | SYSTOLIC BLOOD PRESSURE: 132 MMHG | OXYGEN SATURATION: 93 % | BODY MASS INDEX: 29 KG/M2

## 2025-05-12 DIAGNOSIS — M54.12 CERVICAL RADICULITIS: ICD-10-CM

## 2025-05-12 DIAGNOSIS — M48.02 FORAMINAL STENOSIS OF CERVICAL REGION: Primary | ICD-10-CM

## 2025-05-12 PROCEDURE — 99214 OFFICE O/P EST MOD 30 MIN: CPT | Performed by: PHYSICIAN ASSISTANT

## 2025-05-12 NOTE — PATIENT INSTRUCTIONS
Refill policies:    Allow 2-3 business days for refills; controlled substances may take longer.  Contact your pharmacy at least 5 days prior to running out of medication and have them send an electronic request or submit request through the “request refill” option in your [employer] account.  Refills are not addressed on weekends; covering physicians do not authorize routine medications on weekends.  No narcotics or controlled substances are refilled after noon on Fridays or by on call physicians.  By law, narcotics must be electronically prescribed.  A 30 day supply with no refills is the maximum allowed.  If your prescription is due for a refill, you may be due for a follow up appointment.  To best provide you care, patients receiving routine medications need to be seen at least once a year.  Patients receiving narcotic/controlled substance medications need to be seen at least once every 3 months.  In the event that your preferred pharmacy does not have the requested medication in stock (e.g. Backordered), it is your responsibility to find another pharmacy that has the requested medication available.  We will gladly send a new prescription to that pharmacy at your request.    Scheduling Tests:    If your physician has ordered radiology tests such as MRI or CT scans, please contact Central Scheduling at [phone] right away to schedule the test.  Once scheduled, the [facility] Centralized Referral Team will work with your insurance carrier to obtain pre-certification or prior authorization.  Depending on your insurance carrier, approval may take 3-10 days.  It is highly recommended patients assure they have received an authorization before having a test performed.  If test is done without insurance authorization, patient may be responsible for the entire amount billed.      Precertification and Prior Authorizations:  If your physician has recommended that you have a procedure or additional testing performed the [facility]  Centralized Referral Team will contact your insurance carrier to obtain pre-certification or prior authorization.    You are strongly encouraged to contact your insurance carrier to verify that your procedure/test has been approved and is a COVERED benefit.  Although the [facility] Centralized Referral Team does its due diligence, the insurance carrier gives the disclaimer that \"Although the procedure is authorized, this does not guarantee payment.\"    Ultimately the patient is responsible for payment.   Thank you for your understanding in this matter.  Paperwork Completion:  If you require FMLA or disability paperwork for your recovery, please make sure to either drop it off or have it faxed to our office at [phone]. Be sure the form has your name and date of birth on it.  The form will be faxed to our Forms Department and they will complete it for you.  There is a 25$ fee for all forms that need to be filled out.  Please be aware there is a 10-14 day turnaround time.  You will need to sign a release of information (TARYN) form if your paperwork does not come with one.  You may call the Forms Department with any questions at [phone].  Their fax number is [phone].

## 2025-05-12 NOTE — PROGRESS NOTES
Patient presents in office today with reported pain in cervical region    Current pain level reported = less than 1/10    Last reported dose of nothing      Narcotic Contract renewal NA    Urine Drug screen NA

## 2025-05-12 NOTE — PROGRESS NOTES
HPI:   Hero Bo presents with complaints of Neck pain radiating to L UE .    The pain is described as mild aching, shooting that is intermittent.  The patient’s activity level has increased since last visit.  The pain is worst unrelated to time of day.    Changes in condition/history since last visit: pt is here today for f/u, having been seen once previusly 3/17/25.  At that time, had discussed EVA for neck and L UE radicular pain.  He wanted to remain conservative, and as such, was sent to PT.  He has been diligent about therapy and HEP since starting 3/20/25, to good relief.  Currently, pain is a 0.5/10 on VAS, and does not feel that he requires an injection.      Last procedure: n/a    Date: n/a    Percentage of relief experienced from the procedure: n/a%    Duration of the relief: n/a    The following activities will increase the patient’s pain: staying in the same position for prolonged periods of time    The following activities decrease the patient’s pain:  stretching     Functional Assessment: Patient reports that they are able to complete all of their ADL's such as eating, bathing, using the toilet, dressing and getting up from a bed or a chair independently.    Current Medications:  Current Medications[1]   Patient requires assistance with: No assistance required    Reviewed Patient History Dated: 3/17/25 no changes noted    Physical Exam:   There were no vitals taken for this visit.  VAS Pain Score:  0.5/10  General Appearance: Well developed, well nourished, normal build, independent body habitus, no apparent physical disabilities, well groomed    Neurological Exam: WNL-Orientation to time, place and person, normal mood & effect, normal concentration & attention span  Inspection: non-antalgic, no acute distress   Radiology/Lab Test Reviewed: MRI C spine:     IMPRESSION:   1. Mild to moderate spinal canal stenosis with moderate left-sided neural foraminal narrowing at   C3-C4.   2. Moderate to  severe left-sided neural foraminal narrowing at C5-C6.   3. Moderate to severe bilateral neural foraminal narrowing at C6-C7.   4. No discrete cord lesions.          Lab Results   Component Value Date    WBC 7.2 02/24/2025    WBC 6.5 01/26/2023    WBC 6.6 01/24/2022   No results found for: \"HEMOGLOBIN\"  Lab Results   Component Value Date    .0 02/24/2025    .0 01/26/2023    .0 01/24/2022         Do you have any known blood/bleeding disorders?  No  Does patient currently take blood thinners?   None  Does patient currently take any antibiotics?   No  Patient educated and verbalized understanding.  Medical Decision Making:   Diagnosis:    Encounter Diagnoses   Name Primary?    Foraminal stenosis of cervical region Yes    Cervical radiculitis        Impression: doing very well after PT, and does not feel that additional treatment is required at this time.  Did have questions regarding EVA and surgery, should pain return.  Answered to best of my ability, and asked that he simply follow up PRN, should symptoms return.      Plan: Patient to follow up PRN.    No orders of the defined types were placed in this encounter.      Meds & Refills for this Visit:  Requested Prescriptions      No prescriptions requested or ordered in this encounter       Imaging & Consults:  None    The patient indicates understanding of these issues and agrees to the plan.    ALESIA Sorensen         [1]   Current Outpatient Medications   Medication Sig Dispense Refill    alfuzosin ER 10 MG Oral Tablet 24 Hr Take 1 tablet (10 mg total) by mouth daily. 90 tablet 1    amLODIPine Besy-Benazepril HCl 5-20 MG Oral Cap Take 1 capsule by mouth daily. DUE FOR AN APPOINTMENT 90 capsule 1    atorvastatin 10 MG Oral Tab Take 1 tablet (10 mg total) by mouth nightly. 90 tablet 1    febuxostat 40 MG Oral Tab Take 1 tablet (40 mg total) by mouth daily. 90 tablet 1    Sildenafil Citrate 100 MG Oral Tab Take 1 tablet (100 mg total) by  mouth daily as needed for Erectile Dysfunction. 30 tablet 5    Multiple Vitamins-Minerals (MULTIVITAMIN ADULT OR) Take by mouth daily.      Zinc Gluconate 30 MG Oral Tab Take 30 mg by mouth daily.      Magnesium Citrate 200 MG Oral Tab Take 200 mg by mouth 2 (two) times a day.      Omega-3 1000 MG Oral Cap Take 1,000 mg by mouth daily.

## 2025-05-19 ENCOUNTER — TELEPHONE (OUTPATIENT)
Dept: INTERNAL MEDICINE CLINIC | Facility: CLINIC | Age: 60
End: 2025-05-19

## 2025-05-19 NOTE — TELEPHONE ENCOUNTER
Pt requesting to schedule Shingrix and Pneumococcal vaccine   Order placed last year for Shingrix   Need order for Pneumococcal     Please advise and I will get pt scheduled

## 2025-05-19 NOTE — TELEPHONE ENCOUNTER
TO: Please see below. Pended order for Prevnar 20 for patient. Shingrix order placed 7/19/2024. Still ok to use? Please review and sign for Prevnar 20. TY

## 2025-05-19 NOTE — TELEPHONE ENCOUNTER
PSR: Order signed for Prevnar 20. Shingrix order in place from 7/19/2024, ok to use. Please schedule patient. TY

## 2025-05-23 ENCOUNTER — NURSE ONLY (OUTPATIENT)
Dept: INTERNAL MEDICINE CLINIC | Facility: CLINIC | Age: 60
End: 2025-05-23
Payer: COMMERCIAL

## 2025-05-23 PROCEDURE — 90472 IMMUNIZATION ADMIN EACH ADD: CPT | Performed by: FAMILY MEDICINE

## 2025-05-23 PROCEDURE — 90677 PCV20 VACCINE IM: CPT | Performed by: FAMILY MEDICINE

## 2025-05-23 PROCEDURE — 90471 IMMUNIZATION ADMIN: CPT | Performed by: FAMILY MEDICINE

## 2025-05-23 PROCEDURE — 90750 HZV VACC RECOMBINANT IM: CPT | Performed by: FAMILY MEDICINE

## 2025-06-02 ENCOUNTER — OFFICE VISIT (OUTPATIENT)
Dept: SURGERY | Facility: CLINIC | Age: 60
End: 2025-06-02
Payer: COMMERCIAL

## 2025-06-02 DIAGNOSIS — N13.8 BPH WITH OBSTRUCTION/LOWER URINARY TRACT SYMPTOMS: Primary | ICD-10-CM

## 2025-06-02 DIAGNOSIS — N40.1 BPH WITH OBSTRUCTION/LOWER URINARY TRACT SYMPTOMS: Primary | ICD-10-CM

## 2025-06-02 DIAGNOSIS — R97.20 ELEVATED PSA: ICD-10-CM

## 2025-06-02 LAB
APPEARANCE: CLEAR
BILIRUBIN: NEGATIVE
GLUCOSE (URINE DIPSTICK): NEGATIVE MG/DL
KETONES (URINE DIPSTICK): NEGATIVE MG/DL
LEUKOCYTES: NEGATIVE
MULTISTIX LOT#: NORMAL NUMERIC
NITRITE, URINE: NEGATIVE
OCCULT BLOOD: NEGATIVE
PH, URINE: 7 (ref 4.5–8)
PROTEIN (URINE DIPSTICK): NEGATIVE MG/DL
SPECIFIC GRAVITY: 1.01 (ref 1–1.03)
URINE-COLOR: YELLOW
UROBILINOGEN,SEMI-QN: 0.2 MG/DL (ref 0–1.9)

## 2025-06-02 PROCEDURE — 81003 URINALYSIS AUTO W/O SCOPE: CPT | Performed by: SURGERY

## 2025-06-02 PROCEDURE — 99215 OFFICE O/P EST HI 40 MIN: CPT | Performed by: SURGERY

## 2025-06-02 NOTE — PROGRESS NOTES
Urology Clinic Note    Primary Care Provider:  Darlene Bryson MD     Chief Complaint:   BPH, elevated PSA    HPI & Assessment:   Hero Bo is a 59 year old male with history of HTN, gout, HLD referred for BPH.    He was seen by Dr. Huang in 2022 for BPH and elevated PSA. He had a 76g, PIRADS 2 prostate on MRI in 2020. He has a benign prostate biopsy on 5/7/2020.    Last PSA 2/24/25 was 9.75. On 7/19/24 it was 10.78. It has ranged between 9 and 11 since 2019.    Urinary symptoms are well-controlled on alfuzosin.    Plan:   -Prostate MRI  -Continue alfuzosin       PSA:  Lab Results   Component Value Date    PSA 11.00 (H) 08/21/2023    PSA 10.50 (H) 12/12/2022    PSA 7.02 (H) 12/28/2020    PSA 11.30 (H) 12/26/2019    PSAS 9.75 (H) 02/24/2025    PSAS 10.78 (H) 07/19/2024    PSAS 10.90 (H) 12/11/2019    PSAS 3.96 11/20/2018    PSAS 3.26 03/09/2017        History:   Past Medical History[1]    Past Surgical History[2]    Family History[3]    Short Social Hx on File[4]    Medications (Active prior to today's visit):  Current Medications[5]    Allergies:  Allergies[6]    Review of Systems:   A comprehensive 10-point review of systems was completed.  Pertinent positives and negatives are noted in the the HPI.    Physical Exam:   CONSTITUTIONAL: Well developed, well nourished, in no acute distress  NEUROLOGIC: Alert and oriented  HEAD: Normocephalic, atraumatic  EYES: Sclera non-icteric  ENT: Hearing intact, moist mucous membranes  NECK: No obvious goiter or masses  RESPIRATORY: Normal respiratory effort  SKIN: No evident rashes  ABDOMEN: Soft, non-tender, non-distended  DIGITAL RECTAL EXAM: ~80 gram prostate, no nodules or tenderness      In total, 40 minutes were spent on this patient encounter (including chart review, patient history, physical, counseling, documentation, and communication).    Joe Dickey MD  Staff Urologist  [facility]  Office: [phone]             [1]   Past Medical  History:   Abdominal pain    Arthritis    Essential hypertension, benign    Food intolerance    Gout    High cholesterol    Stress    Unspecified essential hypertension    Wears glasses   [2]   Past Surgical History:  Procedure Laterality Date    Colonoscopy      Other surgical history      ganglion cyst rxn   [3]   Family History  Problem Relation Age of Onset    Hypertension Father     Hypertension Mother     Stroke Maternal Grandfather     Other (Other) Maternal Grandfather         cva   [4]   Social History  Socioeconomic History    Marital status:    Tobacco Use    Smoking status: Former     Current packs/day: 0.00     Types: Cigarettes     Quit date: 2001     Years since quittin.3    Smokeless tobacco: Never   Vaping Use    Vaping status: Never Used   Substance and Sexual Activity    Alcohol use: No     Alcohol/week: 0.0 standard drinks of alcohol    Drug use: No   Other Topics Concern    Caffeine Concern Yes     Comment: 2 cups daily    Exercise Yes     Comment: 2-3x/week     Social Drivers of Health     Food Insecurity: No Food Insecurity (2025)    NCSS - Food Insecurity     Worried About Running Out of Food in the Last Year: No     Ran Out of Food in the Last Year: No   Transportation Needs: No Transportation Needs (2025)    NCSS - Transportation     Lack of Transportation: No   Housing Stability: Not At Risk (2025)    NCSS - Housing/Utilities     Has Housing: Yes     Worried About Losing Housing: No     Unable to Get Utilities: No   [5]   Current Outpatient Medications   Medication Sig Dispense Refill    alfuzosin ER 10 MG Oral Tablet 24 Hr Take 1 tablet (10 mg total) by mouth daily. 90 tablet 1    amLODIPine Besy-Benazepril HCl 5-20 MG Oral Cap Take 1 capsule by mouth daily. DUE FOR AN APPOINTMENT 90 capsule 1    atorvastatin 10 MG Oral Tab Take 1 tablet (10 mg total) by mouth nightly. 90 tablet 1    febuxostat 40 MG Oral Tab Take 1 tablet (40 mg total) by mouth daily. 90  tablet 1    Sildenafil Citrate 100 MG Oral Tab Take 1 tablet (100 mg total) by mouth daily as needed for Erectile Dysfunction. 30 tablet 5    Multiple Vitamins-Minerals (MULTIVITAMIN ADULT OR) Take by mouth daily.      Zinc Gluconate 30 MG Oral Tab Take 30 mg by mouth daily.      Magnesium Citrate 200 MG Oral Tab Take 200 mg by mouth 2 (two) times a day.      Omega-3 1000 MG Oral Cap Take 1,000 mg by mouth daily.     [6] No Known Allergies

## (undated) NOTE — LETTER
10/24/18        Hero Bo  [id number]      Dear Antonella Mensah,    Our records indicate that you have outstanding lab work and or testing that was ordered for you and has not yet been completed:  Orders Placed This Encounter

## (undated) NOTE — MR AVS SNAPSHOT
[place]  [address] [facility] [address]  [address] [facility] [id number] [phone]               Thank you for choosing us for your health care visit with Roxie To MD.  We are glad to serve you and happy to provide you with this sum Pure hypercholesterolemia    Vitamin D deficiency          Instructions and Information about Your Health     None      Allergies as of Mar 09, 2017     No Known Allergies                Today's Vital Signs     BP Pulse Temp Height Weight BMI    118/82 mm

## (undated) NOTE — LETTER
12/04/19        Hero Bo  [address] [facility] [address]      Dear Perla Kasper,    Our records indicate that you have outstanding lab work and or testing that was ordered for you and has not yet been completed:  Orders Placed This Encounter